# Patient Record
Sex: MALE | Race: WHITE | NOT HISPANIC OR LATINO | Employment: OTHER | URBAN - METROPOLITAN AREA
[De-identification: names, ages, dates, MRNs, and addresses within clinical notes are randomized per-mention and may not be internally consistent; named-entity substitution may affect disease eponyms.]

---

## 2023-03-23 LAB
ALANINE AMINOTRANSFERASE (SGPT) (U/L) IN SER/PLAS: 16 U/L (ref 10–52)
ALBUMIN (G/DL) IN SER/PLAS: 4.3 G/DL (ref 3.4–5)
ALKALINE PHOSPHATASE (U/L) IN SER/PLAS: 46 U/L (ref 33–136)
ANION GAP IN SER/PLAS: 9 MMOL/L (ref 10–20)
ASPARTATE AMINOTRANSFERASE (SGOT) (U/L) IN SER/PLAS: 19 U/L (ref 9–39)
BASOPHILS (10*3/UL) IN BLOOD BY AUTOMATED COUNT: 0.03 X10E9/L (ref 0–0.1)
BASOPHILS/100 LEUKOCYTES IN BLOOD BY AUTOMATED COUNT: 0.5 % (ref 0–2)
BILIRUBIN TOTAL (MG/DL) IN SER/PLAS: 0.7 MG/DL (ref 0–1.2)
CALCIUM (MG/DL) IN SER/PLAS: 9.3 MG/DL (ref 8.6–10.3)
CARBON DIOXIDE, TOTAL (MMOL/L) IN SER/PLAS: 32 MMOL/L (ref 21–32)
CHLORIDE (MMOL/L) IN SER/PLAS: 103 MMOL/L (ref 98–107)
CHOLESTEROL (MG/DL) IN SER/PLAS: 171 MG/DL (ref 0–199)
CHOLESTEROL IN HDL (MG/DL) IN SER/PLAS: 47.5 MG/DL
CHOLESTEROL/HDL RATIO: 3.6
CREATININE (MG/DL) IN SER/PLAS: 0.73 MG/DL (ref 0.5–1.3)
EOSINOPHILS (10*3/UL) IN BLOOD BY AUTOMATED COUNT: 0.35 X10E9/L (ref 0–0.7)
EOSINOPHILS/100 LEUKOCYTES IN BLOOD BY AUTOMATED COUNT: 5.5 % (ref 0–6)
ERYTHROCYTE DISTRIBUTION WIDTH (RATIO) BY AUTOMATED COUNT: 13.3 % (ref 11.5–14.5)
ERYTHROCYTE MEAN CORPUSCULAR HEMOGLOBIN CONCENTRATION (G/DL) BY AUTOMATED: 32.7 G/DL (ref 32–36)
ERYTHROCYTE MEAN CORPUSCULAR VOLUME (FL) BY AUTOMATED COUNT: 88 FL (ref 80–100)
ERYTHROCYTES (10*6/UL) IN BLOOD BY AUTOMATED COUNT: 5.33 X10E12/L (ref 4.5–5.9)
GFR MALE: >90 ML/MIN/1.73M2
GLUCOSE (MG/DL) IN SER/PLAS: 88 MG/DL (ref 74–99)
HEMATOCRIT (%) IN BLOOD BY AUTOMATED COUNT: 47.1 % (ref 41–52)
HEMOGLOBIN (G/DL) IN BLOOD: 15.4 G/DL (ref 13.5–17.5)
IMMATURE GRANULOCYTES/100 LEUKOCYTES IN BLOOD BY AUTOMATED COUNT: 0.5 % (ref 0–0.9)
LDL: 96 MG/DL (ref 0–99)
LEUKOCYTES (10*3/UL) IN BLOOD BY AUTOMATED COUNT: 6.4 X10E9/L (ref 4.4–11.3)
LYMPHOCYTES (10*3/UL) IN BLOOD BY AUTOMATED COUNT: 1.91 X10E9/L (ref 1.2–4.8)
LYMPHOCYTES/100 LEUKOCYTES IN BLOOD BY AUTOMATED COUNT: 29.9 % (ref 13–44)
MAGNESIUM (MG/DL) IN SER/PLAS: 1.86 MG/DL (ref 1.6–2.4)
MONOCYTES (10*3/UL) IN BLOOD BY AUTOMATED COUNT: 0.55 X10E9/L (ref 0.1–1)
MONOCYTES/100 LEUKOCYTES IN BLOOD BY AUTOMATED COUNT: 8.6 % (ref 2–10)
NEUTROPHILS (10*3/UL) IN BLOOD BY AUTOMATED COUNT: 3.51 X10E9/L (ref 1.2–7.7)
NEUTROPHILS/100 LEUKOCYTES IN BLOOD BY AUTOMATED COUNT: 55 % (ref 40–80)
PLATELETS (10*3/UL) IN BLOOD AUTOMATED COUNT: 242 X10E9/L (ref 150–450)
POTASSIUM (MMOL/L) IN SER/PLAS: 4.5 MMOL/L (ref 3.5–5.3)
PROSTATE SPECIFIC AG (NG/ML) IN SER/PLAS: 7.66 NG/ML (ref 0–4)
PROTEIN TOTAL: 6.7 G/DL (ref 6.4–8.2)
SODIUM (MMOL/L) IN SER/PLAS: 139 MMOL/L (ref 136–145)
THYROTROPIN (MIU/L) IN SER/PLAS BY DETECTION LIMIT <= 0.05 MIU/L: 1.83 MIU/L (ref 0.44–3.98)
TRIGLYCERIDE (MG/DL) IN SER/PLAS: 140 MG/DL (ref 0–149)
UREA NITROGEN (MG/DL) IN SER/PLAS: 12 MG/DL (ref 6–23)
VLDL: 28 MG/DL (ref 0–40)

## 2023-05-01 DIAGNOSIS — I10 PRIMARY HYPERTENSION: Primary | ICD-10-CM

## 2023-05-01 PROBLEM — G47.30 SLEEP APNEA: Status: ACTIVE | Noted: 2023-05-01

## 2023-05-01 PROBLEM — R47.01 EXPRESSIVE APHASIA: Status: ACTIVE | Noted: 2023-05-01

## 2023-05-01 PROBLEM — F17.200 TOBACCO USE DISORDER: Status: ACTIVE | Noted: 2023-05-01

## 2023-05-01 PROBLEM — N40.1 BENIGN PROSTATIC HYPERPLASIA WITH URINARY FREQUENCY: Status: ACTIVE | Noted: 2023-05-01

## 2023-05-01 PROBLEM — C61 PROSTATE CANCER (MULTI): Status: ACTIVE | Noted: 2023-05-01

## 2023-05-01 PROBLEM — R26.81 UNSTEADY GAIT: Status: ACTIVE | Noted: 2023-05-01

## 2023-05-01 PROBLEM — R35.0 BENIGN PROSTATIC HYPERPLASIA WITH URINARY FREQUENCY: Status: ACTIVE | Noted: 2023-05-01

## 2023-05-01 PROBLEM — J44.9 CHRONIC OBSTRUCTIVE PULMONARY DISEASE (MULTI): Status: ACTIVE | Noted: 2023-05-01

## 2023-05-01 PROBLEM — G31.2 ALCOHOLIC ENCEPHALOPATHY (CMS-HCC): Status: ACTIVE | Noted: 2023-05-01

## 2023-05-01 RX ORDER — TAMSULOSIN HYDROCHLORIDE 0.4 MG/1
1 CAPSULE ORAL
COMMUNITY
Start: 2022-06-07 | End: 2023-10-23 | Stop reason: SDUPTHER

## 2023-05-01 RX ORDER — FLUTICASONE FUROATE AND VILANTEROL 200; 25 UG/1; UG/1
1 POWDER RESPIRATORY (INHALATION) DAILY
COMMUNITY
Start: 2022-01-10 | End: 2024-04-03 | Stop reason: ALTCHOICE

## 2023-05-01 RX ORDER — LISINOPRIL 2.5 MG/1
1 TABLET ORAL DAILY
COMMUNITY
Start: 2019-09-05

## 2023-05-01 RX ORDER — VARENICLINE TARTRATE 1 MG/1
1 TABLET, FILM COATED ORAL 2 TIMES DAILY
COMMUNITY
Start: 2022-05-17 | End: 2023-08-16

## 2023-05-01 RX ORDER — CARVEDILOL 3.12 MG/1
1 TABLET ORAL
COMMUNITY
Start: 2019-09-05 | End: 2023-05-01 | Stop reason: SDUPTHER

## 2023-05-01 RX ORDER — TIZANIDINE 4 MG/1
1 TABLET ORAL 3 TIMES DAILY PRN
COMMUNITY
Start: 2020-08-07 | End: 2024-04-03 | Stop reason: SDUPTHER

## 2023-05-01 RX ORDER — ASPIRIN 81 MG/1
1 TABLET ORAL DAILY
COMMUNITY

## 2023-05-01 RX ORDER — CARVEDILOL 3.12 MG/1
3.12 TABLET ORAL
Qty: 60 TABLET | Refills: 5 | Status: SHIPPED | OUTPATIENT
Start: 2023-05-01 | End: 2023-10-23

## 2023-05-01 RX ORDER — PANTOPRAZOLE SODIUM 20 MG/1
1 TABLET, DELAYED RELEASE ORAL DAILY
COMMUNITY
Start: 2020-02-25 | End: 2023-08-16

## 2023-08-16 ENCOUNTER — OFFICE VISIT (OUTPATIENT)
Dept: PRIMARY CARE | Facility: CLINIC | Age: 63
End: 2023-08-16
Payer: COMMERCIAL

## 2023-08-16 VITALS
DIASTOLIC BLOOD PRESSURE: 84 MMHG | HEIGHT: 71 IN | WEIGHT: 216.2 LBS | HEART RATE: 66 BPM | TEMPERATURE: 97.6 F | BODY MASS INDEX: 30.27 KG/M2 | OXYGEN SATURATION: 96 % | RESPIRATION RATE: 16 BRPM | SYSTOLIC BLOOD PRESSURE: 120 MMHG

## 2023-08-16 DIAGNOSIS — R42 VERTIGO: ICD-10-CM

## 2023-08-16 DIAGNOSIS — L84 CALLUS OF FOOT: ICD-10-CM

## 2023-08-16 DIAGNOSIS — C61 PROSTATE CANCER (MULTI): ICD-10-CM

## 2023-08-16 DIAGNOSIS — J44.9 CHRONIC OBSTRUCTIVE PULMONARY DISEASE, UNSPECIFIED COPD TYPE (MULTI): ICD-10-CM

## 2023-08-16 DIAGNOSIS — I10 PRIMARY HYPERTENSION: ICD-10-CM

## 2023-08-16 DIAGNOSIS — Z00.00 ROUTINE GENERAL MEDICAL EXAMINATION AT A HEALTH CARE FACILITY: Primary | ICD-10-CM

## 2023-08-16 PROBLEM — G31.2 ALCOHOLIC ENCEPHALOPATHY (CMS-HCC): Status: RESOLVED | Noted: 2023-05-01 | Resolved: 2023-08-16

## 2023-08-16 PROCEDURE — 99396 PREV VISIT EST AGE 40-64: CPT | Performed by: FAMILY MEDICINE

## 2023-08-16 PROCEDURE — 3079F DIAST BP 80-89 MM HG: CPT | Performed by: FAMILY MEDICINE

## 2023-08-16 PROCEDURE — 11055 PARING/CUTG B9 HYPRKER LES 1: CPT | Performed by: FAMILY MEDICINE

## 2023-08-16 PROCEDURE — 3074F SYST BP LT 130 MM HG: CPT | Performed by: FAMILY MEDICINE

## 2023-08-16 PROCEDURE — 4004F PT TOBACCO SCREEN RCVD TLK: CPT | Performed by: FAMILY MEDICINE

## 2023-08-16 RX ORDER — MECLIZINE HYDROCHLORIDE 25 MG/1
25 TABLET ORAL 3 TIMES DAILY PRN
Qty: 90 TABLET | Refills: 11 | Status: SHIPPED | OUTPATIENT
Start: 2023-08-16 | End: 2024-04-03 | Stop reason: ALTCHOICE

## 2023-08-16 ASSESSMENT — ENCOUNTER SYMPTOMS
SORE THROAT: 0
NUMBNESS: 0
ADENOPATHY: 0
ARTHRALGIAS: 0
SEIZURES: 0
FATIGUE: 0
NERVOUS/ANXIOUS: 0
CONSTIPATION: 0
FACIAL ASYMMETRY: 0
SHORTNESS OF BREATH: 0
HALLUCINATIONS: 0
ABDOMINAL DISTENTION: 0
EYE ITCHING: 0
FREQUENCY: 0
WOUND: 0
CHEST TIGHTNESS: 0
BRUISES/BLEEDS EASILY: 0
PHOTOPHOBIA: 0
HEMATURIA: 0
RHINORRHEA: 0
CHOKING: 0
DIARRHEA: 0
ABDOMINAL PAIN: 0
EYE PAIN: 0
JOINT SWELLING: 0
WHEEZING: 0
BACK PAIN: 0
VOMITING: 0
POLYDIPSIA: 0
EYE REDNESS: 0
BLOOD IN STOOL: 0
MYALGIAS: 0
NECK PAIN: 0
DYSURIA: 0
SINUS PRESSURE: 0
NECK STIFFNESS: 0
VOICE CHANGE: 0
CONFUSION: 0
ACTIVITY CHANGE: 0
AGITATION: 0
APPETITE CHANGE: 0
TROUBLE SWALLOWING: 0
EYE DISCHARGE: 0
NAUSEA: 0
WEAKNESS: 0
TREMORS: 0
SLEEP DISTURBANCE: 0
PALPITATIONS: 0
CHILLS: 0
DYSPHORIC MOOD: 0
FEVER: 0
LIGHT-HEADEDNESS: 0
DIAPHORESIS: 0
COUGH: 0
HEADACHES: 0
UNEXPECTED WEIGHT CHANGE: 0
FLANK PAIN: 0

## 2023-08-16 NOTE — PROGRESS NOTES
Subjective   Patient ID: Home Salas is a 63 y.o. male who presents for Annual Exam (Labs done in March. /Patient would like to discuss left foot pain x few months. Patient states there is a growth there, maybe a wart, would like it checked out. ) and Dizziness (Patient c.o still getting dizzy when laying down and looking up. ).    Well Adult Physical   Patient here for a comprehensive physical exam.The patient reports overall doing well but has chronic issues with vertigo when he looks up or when he is lying flat and reading in bed.  Also has a callus on the bottom of his left foot that is painful.  He has numerous warts that he would like treated.  Has chronic expressive aphasia communicates through his sister and also has an electronic pad that he utilizes to write notes.    Do you take any herbs or supplements that were not prescribed by a doctor? no   Are you taking calcium supplements? no   Are you taking aspirin daily? yes     History:  Date last PSA: 3/23/2023        Hypertension  This is a chronic problem. The current episode started more than 1 year ago. The problem is unchanged. The problem is controlled. Pertinent negatives include no chest pain, headaches, neck pain, palpitations or shortness of breath. There are no associated agents to hypertension. Past treatments include ACE inhibitors and beta blockers. The current treatment provides significant improvement. There are no compliance problems.  Hypertensive end-organ damage includes CVA. There is no history of a hypertension causing med or a thyroid problem.   Dizziness  This is a chronic problem. The current episode started more than 1 year ago. The problem occurs daily. The problem has been unchanged. Associated symptoms include vertigo. Pertinent negatives include no abdominal pain, arthralgias, chest pain, chills, congestion, coughing, diaphoresis, fatigue, fever, headaches, joint swelling, myalgias, nausea, neck pain, numbness, rash, sore  throat, vomiting or weakness. Exacerbated by: Lying down or looking up.  Anytime his head is parallel to the ground. He has tried position changes (Change in position has given significant improvement as long as he maintains different position.  Has done vestibular rehab in the past with no improvement) for the symptoms. The treatment provided significant relief.        Review of Systems   Constitutional:  Negative for activity change, appetite change, chills, diaphoresis, fatigue, fever and unexpected weight change.   HENT:  Negative for congestion, ear pain, hearing loss, nosebleeds, postnasal drip, rhinorrhea, sinus pressure, sneezing, sore throat, tinnitus, trouble swallowing and voice change.    Eyes:  Negative for photophobia, pain, discharge, redness, itching and visual disturbance.   Respiratory:  Negative for cough, choking, chest tightness, shortness of breath and wheezing.    Cardiovascular:  Negative for chest pain, palpitations and leg swelling.   Gastrointestinal:  Negative for abdominal distention, abdominal pain, blood in stool, constipation, diarrhea, nausea and vomiting.   Endocrine: Negative for cold intolerance, heat intolerance, polydipsia and polyuria.   Genitourinary:  Negative for dysuria, flank pain, frequency, hematuria and urgency.   Musculoskeletal:  Negative for arthralgias, back pain, joint swelling, myalgias, neck pain and neck stiffness.   Skin:  Negative for rash and wound.        Warts bilateral hands  Callus left foot  Numerous actinic keratoses bilateral forearms   Allergic/Immunologic: Negative for immunocompromised state.   Neurological:  Positive for dizziness, vertigo and speech difficulty. Negative for tremors, seizures, syncope, facial asymmetry, weakness, light-headedness, numbness and headaches.        Chronic expressive aphasia as chronic sequelae of prior CVA   Hematological:  Negative for adenopathy. Does not bruise/bleed easily.   Psychiatric/Behavioral:  Negative for  "agitation, behavioral problems, confusion, dysphoric mood, hallucinations, self-injury, sleep disturbance and suicidal ideas. The patient is not nervous/anxious.        Objective   /84 (BP Location: Right arm, Patient Position: Sitting)   Pulse 66   Temp 36.4 °C (97.6 °F)   Resp 16   Ht 1.803 m (5' 11\")   Wt 98.1 kg (216 lb 3.2 oz)   SpO2 96%   BMI 30.15 kg/m²     Physical Exam  Constitutional:       General: He is not in acute distress.     Appearance: He is not ill-appearing or diaphoretic.   HENT:      Head: Normocephalic and atraumatic.      Right Ear: External ear normal.      Left Ear: External ear normal.      Nose: Nose normal. No rhinorrhea.   Eyes:      General: Lids are normal. No scleral icterus.        Right eye: No discharge.         Left eye: No discharge.      Conjunctiva/sclera: Conjunctivae normal.   Cardiovascular:      Rate and Rhythm: Normal rate and regular rhythm.      Pulses: Normal pulses.      Heart sounds: No murmur heard.  Pulmonary:      Effort: Pulmonary effort is normal. No respiratory distress.      Breath sounds: No decreased breath sounds, wheezing, rhonchi or rales.   Abdominal:      General: Bowel sounds are normal. There is no distension.      Palpations: Abdomen is soft. There is no mass.      Tenderness: There is no abdominal tenderness. There is no guarding or rebound.   Musculoskeletal:         General: No swelling, tenderness or deformity.      Cervical back: No rigidity or tenderness.      Right lower leg: No edema.      Left lower leg: No edema.        Feet:    Feet:      Comments: Callus as noted above  Lymphadenopathy:      Cervical: No cervical adenopathy.      Upper Body:      Right upper body: No supraclavicular adenopathy.      Left upper body: No supraclavicular adenopathy.   Skin:     General: Skin is warm and dry.      Coloration: Skin is not jaundiced or pale.      Findings: No erythema, lesion or rash.             Comments: Warts bilateral " hands  Actinic keratoses bilateral forearms   Neurological:      General: No focal deficit present.      Mental Status: He is alert and oriented to person, place, and time.      Cranial Nerves: Dysarthria present.      Sensory: No sensory deficit.      Motor: No weakness or tremor.      Coordination: Romberg sign negative. Coordination normal.      Gait: Gait normal.      Comments: Expressive aphasia   Psychiatric:         Mood and Affect: Mood normal. Affect is not inappropriate.         Behavior: Behavior normal.         Assessment/Plan   Diagnoses and all orders for this visit:  Routine general medical examination at a health care facility  Primary hypertension  -     CBC and Auto Differential; Future  -     Comprehensive Metabolic Panel; Future  -     Lipid Panel; Future  -     Magnesium; Future  -     TSH with reflex to Free T4 if abnormal; Future  -     Follow Up In [facility] - PCP - Established; Future  Chronic obstructive pulmonary disease, unspecified COPD type (CMS/HCC)  -     CBC and Auto Differential; Future  -     Magnesium; Future  -     Follow Up In [facility] - PCP - Established; Future  Prostate cancer (CMS/HCC)  Vertigo  -     meclizine (Antivert) 25 mg tablet; Take 1 tablet (25 mg) by mouth 3 times a day as needed for dizziness.  Callus of foot  -     Trim Hyperkeratonic Skin Lesion; Future    Risks of callus trimming were discussed.  Patient stated understanding acceptance of risks.  Consent for trimming was obtained.  Callus was cleansed with alcohol and trimmed utilizing a biopsy punch.  Patient tolerated well and reported immediate relief of foot pain.  Aftercare instructions were given.  He is to soak feet daily for 30 minutes and then use pumice stone to remove any excess skin in the area of the callus.  Patient stated understanding agreement with that plan.  As far as the warts they were each treated with a single freeze/thaw cycle of liquid nitrogen.  And 5 actinic  keratosis left forearm and 4 actinic keratoses right forearm were also treated with a single freeze/thaw cycle.  Aftercare instructions were discussed patient tolerated procedure well.  If he does not get significant improvement we will arrange for dermatologic evaluation.  Patient stated agreement with that plan.

## 2023-08-17 ASSESSMENT — ENCOUNTER SYMPTOMS
SPEECH DIFFICULTY: 1
VERTIGO: 1
DIZZINESS: 1
HYPERTENSION: 1

## 2023-09-20 ENCOUNTER — TELEPHONE (OUTPATIENT)
Dept: PRIMARY CARE | Facility: CLINIC | Age: 63
End: 2023-09-20
Payer: COMMERCIAL

## 2023-09-20 DIAGNOSIS — R07.81 RIB PAIN ON LEFT SIDE: ICD-10-CM

## 2023-09-20 DIAGNOSIS — Z91.81 STATUS POST FALL: ICD-10-CM

## 2023-09-20 NOTE — TELEPHONE ENCOUNTER
Dr Marie pt calling with concerns, pt seen on 8/16 and mentioned he had a recent fall and hurt his Lt Side - rib area. Pt states hasn't gotten any better and asking if Dr would order an xray for him or his recommendations?? Pt can be reached at [phone]

## 2023-10-22 DIAGNOSIS — I10 PRIMARY HYPERTENSION: ICD-10-CM

## 2023-10-23 DIAGNOSIS — N40.1 BENIGN PROSTATIC HYPERPLASIA WITH URINARY FREQUENCY: Primary | ICD-10-CM

## 2023-10-23 DIAGNOSIS — R35.0 BENIGN PROSTATIC HYPERPLASIA WITH URINARY FREQUENCY: Primary | ICD-10-CM

## 2023-10-23 RX ORDER — CARVEDILOL 3.12 MG/1
3.12 TABLET ORAL
Qty: 60 TABLET | Refills: 5 | Status: SHIPPED | OUTPATIENT
Start: 2023-10-23 | End: 2024-04-03 | Stop reason: SDUPTHER

## 2023-10-23 RX ORDER — TAMSULOSIN HYDROCHLORIDE 0.4 MG/1
0.4 CAPSULE ORAL DAILY
Qty: 90 CAPSULE | Refills: 1 | Status: SHIPPED | OUTPATIENT
Start: 2023-10-23 | End: 2024-03-27 | Stop reason: SDUPTHER

## 2023-10-31 ENCOUNTER — CLINICAL SUPPORT (OUTPATIENT)
Dept: PRIMARY CARE | Facility: CLINIC | Age: 63
End: 2023-10-31
Payer: COMMERCIAL

## 2023-10-31 DIAGNOSIS — Z23 NEED FOR VACCINATION: ICD-10-CM

## 2023-10-31 PROCEDURE — 91320 SARSCV2 VAC 30MCG TRS-SUC IM: CPT | Performed by: FAMILY MEDICINE

## 2023-10-31 PROCEDURE — 90480 ADMN SARSCOV2 VAC 1/ONLY CMP: CPT | Performed by: FAMILY MEDICINE

## 2023-10-31 NOTE — PROGRESS NOTES
Home Salas here today for 8014-1463 dose of CoV-19 vaccine, Pfizer BiValent. Questionnaire completed. CoV Screening questions answered. Administered in right Deltoid. Patient tolerated well.

## 2024-03-27 DIAGNOSIS — R35.0 BENIGN PROSTATIC HYPERPLASIA WITH URINARY FREQUENCY: ICD-10-CM

## 2024-03-27 DIAGNOSIS — N40.1 BENIGN PROSTATIC HYPERPLASIA WITH URINARY FREQUENCY: ICD-10-CM

## 2024-03-27 RX ORDER — TAMSULOSIN HYDROCHLORIDE 0.4 MG/1
0.4 CAPSULE ORAL DAILY
Qty: 90 CAPSULE | Refills: 1 | Status: SHIPPED | OUTPATIENT
Start: 2024-03-27 | End: 2024-09-23

## 2024-04-03 ENCOUNTER — OFFICE VISIT (OUTPATIENT)
Dept: PRIMARY CARE | Facility: CLINIC | Age: 64
End: 2024-04-03
Payer: COMMERCIAL

## 2024-04-03 VITALS
TEMPERATURE: 97 F | RESPIRATION RATE: 18 BRPM | OXYGEN SATURATION: 95 % | HEIGHT: 71 IN | BODY MASS INDEX: 31.5 KG/M2 | HEART RATE: 68 BPM | WEIGHT: 225 LBS | DIASTOLIC BLOOD PRESSURE: 78 MMHG | SYSTOLIC BLOOD PRESSURE: 128 MMHG

## 2024-04-03 DIAGNOSIS — Z12.5 SPECIAL SCREENING EXAMINATION FOR NEOPLASM OF PROSTATE: ICD-10-CM

## 2024-04-03 DIAGNOSIS — M54.50 CHRONIC LEFT-SIDED LOW BACK PAIN WITHOUT SCIATICA: ICD-10-CM

## 2024-04-03 DIAGNOSIS — I10 PRIMARY HYPERTENSION: ICD-10-CM

## 2024-04-03 DIAGNOSIS — H92.02 EAR PAIN, LEFT: Primary | ICD-10-CM

## 2024-04-03 DIAGNOSIS — G89.29 CHRONIC LEFT-SIDED LOW BACK PAIN WITHOUT SCIATICA: ICD-10-CM

## 2024-04-03 PROCEDURE — 99213 OFFICE O/P EST LOW 20 MIN: CPT | Performed by: FAMILY MEDICINE

## 2024-04-03 PROCEDURE — 3074F SYST BP LT 130 MM HG: CPT | Performed by: FAMILY MEDICINE

## 2024-04-03 PROCEDURE — 3078F DIAST BP <80 MM HG: CPT | Performed by: FAMILY MEDICINE

## 2024-04-03 RX ORDER — AMOXICILLIN 875 MG/1
875 TABLET, FILM COATED ORAL 2 TIMES DAILY
Qty: 28 TABLET | Refills: 0 | Status: SHIPPED | OUTPATIENT
Start: 2024-04-03 | End: 2024-04-17

## 2024-04-03 RX ORDER — CARVEDILOL 3.12 MG/1
3.12 TABLET ORAL
Qty: 60 TABLET | Refills: 5 | Status: SHIPPED | OUTPATIENT
Start: 2024-04-03

## 2024-04-03 RX ORDER — TIZANIDINE 4 MG/1
4 TABLET ORAL EVERY 8 HOURS PRN
Qty: 30 TABLET | Refills: 1 | Status: SHIPPED | OUTPATIENT
Start: 2024-04-03

## 2024-04-03 ASSESSMENT — ENCOUNTER SYMPTOMS
SEIZURES: 0
APPETITE CHANGE: 0
VOICE CHANGE: 0
ACTIVITY CHANGE: 0
SLEEP DISTURBANCE: 0
TREMORS: 0
PHOTOPHOBIA: 0
ARTHRALGIAS: 0
BACK PAIN: 0
PALPITATIONS: 0
CHEST TIGHTNESS: 0
DIZZINESS: 1
VERTIGO: 1
JOINT SWELLING: 0
CHOKING: 0
NECK PAIN: 0
EYE ITCHING: 0
DIAPHORESIS: 0
BRUISES/BLEEDS EASILY: 0
WHEEZING: 0
CONSTIPATION: 0
DYSURIA: 0
EYE DISCHARGE: 0
EYE REDNESS: 0
SINUS PRESSURE: 0
FEVER: 0
TROUBLE SWALLOWING: 0
FLANK PAIN: 0
EYE PAIN: 0
CONFUSION: 0
ABDOMINAL DISTENTION: 0
VOMITING: 0
FREQUENCY: 0
SINUS PAIN: 0
DYSPHORIC MOOD: 0
FATIGUE: 0
CHILLS: 0
FACIAL ASYMMETRY: 0
SPEECH DIFFICULTY: 1
AGITATION: 0
LIGHT-HEADEDNESS: 0
ABDOMINAL PAIN: 0
WOUND: 0
NECK STIFFNESS: 0
NUMBNESS: 0
WEAKNESS: 0
HYPERTENSION: 1
NERVOUS/ANXIOUS: 0
POLYDIPSIA: 0
MYALGIAS: 0
RHINORRHEA: 0
NAUSEA: 0
HEADACHES: 0
SORE THROAT: 0
HALLUCINATIONS: 0
ADENOPATHY: 0
HEMATURIA: 0
COUGH: 0
SHORTNESS OF BREATH: 0
UNEXPECTED WEIGHT CHANGE: 0
BLOOD IN STOOL: 0
DIARRHEA: 0

## 2024-04-03 NOTE — PROGRESS NOTES
Subjective   Patient ID: Home Salas is a 63 y.o. male who presents for Earache (Left ).    Patient is here regarding left earache, had some discharge this morning, previously had wax removed 2 months ago. Patient additionally reports meclizine has not helped dizziness, needs refill of carvedilol, tizanidine, and order for updated blood work. Has chronic expressive aphasia communicates through his sister (not present at this appointment) and also has an electronic pad that he utilizes to write notes.      Hypertension  This is a chronic problem. The current episode started more than 1 year ago. The problem is unchanged. The problem is controlled. Pertinent negatives include no chest pain, headaches, neck pain, palpitations or shortness of breath. There are no associated agents to hypertension. Past treatments include ACE inhibitors and beta blockers. The current treatment provides significant improvement. There are no compliance problems.  Hypertensive end-organ damage includes CVA. There is no history of a hypertension causing med or a thyroid problem.   Dizziness  This is a chronic problem. The current episode started more than 1 year ago. The problem occurs daily. The problem has been unchanged. Associated symptoms include vertigo. Pertinent negatives include no abdominal pain, arthralgias, chest pain, chills, congestion, coughing, diaphoresis, fatigue, fever, headaches, joint swelling, myalgias, nausea, neck pain, numbness, rash, sore throat, vomiting or weakness. Exacerbated by: Lying down or looking up.  Anytime his head is parallel to the ground. He has tried position changes (Change in position has given significant improvement as long as he maintains different position.  Has done vestibular rehab in the past with no improvement) for the symptoms. The treatment provided no relief (Meclizine has not helped dizziness, pt wants to discontinue treatment by medication).   Earache   There is pain in the left ear.  This is a new problem. Episode onset: two days ago. Episode frequency: morning and night. The problem has been unchanged. There has been no fever. The pain is at a severity of 4/10. Associated symptoms include ear discharge. Pertinent negatives include no abdominal pain, coughing, diarrhea, headaches, hearing loss, neck pain, rash, rhinorrhea, sore throat or vomiting. He has tried nothing for the symptoms.        Review of Systems   Constitutional:  Negative for activity change, appetite change, chills, diaphoresis, fatigue, fever and unexpected weight change.   HENT:  Positive for ear discharge and ear pain. Negative for congestion, hearing loss, nosebleeds, postnasal drip, rhinorrhea, sinus pressure, sinus pain, sneezing, sore throat, tinnitus, trouble swallowing and voice change.         Left ear discharge occasionally, most recent episode this morning   Eyes:  Negative for photophobia, pain, discharge, redness, itching and visual disturbance.   Respiratory:  Negative for cough, choking, chest tightness, shortness of breath and wheezing.    Cardiovascular:  Negative for chest pain, palpitations and leg swelling.   Gastrointestinal:  Negative for abdominal distention, abdominal pain, blood in stool, constipation, diarrhea, nausea and vomiting.   Endocrine: Negative for cold intolerance, heat intolerance, polydipsia and polyuria.   Genitourinary:  Negative for dysuria, flank pain, frequency, hematuria and urgency.   Musculoskeletal:  Negative for arthralgias, back pain, joint swelling, myalgias, neck pain and neck stiffness.   Skin:  Negative for rash and wound.        Warts bilateral hands  Callus left foot  Numerous actinic keratoses bilateral forearms   Allergic/Immunologic: Negative for immunocompromised state.   Neurological:  Positive for dizziness, vertigo and speech difficulty. Negative for tremors, seizures, syncope, facial asymmetry, weakness, light-headedness, numbness and headaches.        Chronic  "expressive aphasia as chronic sequelae of prior CVA   Hematological:  Negative for adenopathy. Does not bruise/bleed easily.   Psychiatric/Behavioral:  Negative for agitation, behavioral problems, confusion, dysphoric mood, hallucinations, self-injury, sleep disturbance and suicidal ideas. The patient is not nervous/anxious.      Objective   /78 (BP Location: Left arm, Patient Position: Sitting, BP Cuff Size: Large adult)   Pulse 68   Temp 36.1 °C (97 °F) (Temporal)   Resp 18   Ht 1.803 m (5' 11\")   Wt 102 kg (225 lb)   SpO2 95%   BMI 31.38 kg/m²     Physical Exam  Constitutional:       General: He is not in acute distress.     Appearance: He is not ill-appearing or diaphoretic.   HENT:      Head: Normocephalic and atraumatic.      Right Ear: External ear normal. There is impacted cerumen.      Left Ear: External ear normal. A middle ear effusion is present. There is no impacted cerumen.      Nose: Nose normal. No rhinorrhea.   Eyes:      General: Lids are normal. No scleral icterus.        Right eye: No discharge.         Left eye: No discharge.      Conjunctiva/sclera: Conjunctivae normal.   Cardiovascular:      Rate and Rhythm: Normal rate and regular rhythm.      Pulses: Normal pulses.      Heart sounds: No murmur heard.  Pulmonary:      Effort: Pulmonary effort is normal. No respiratory distress.      Breath sounds: No decreased breath sounds, wheezing, rhonchi or rales.   Abdominal:      General: Bowel sounds are normal. There is no distension.      Palpations: Abdomen is soft. There is no mass.      Tenderness: There is no abdominal tenderness. There is no guarding or rebound.   Musculoskeletal:         General: No swelling, tenderness or deformity.      Cervical back: No rigidity or tenderness.      Right lower leg: No edema.      Left lower leg: No edema.   Lymphadenopathy:      Cervical: No cervical adenopathy.      Upper Body:      Right upper body: No supraclavicular adenopathy.      Left " upper body: No supraclavicular adenopathy.   Skin:     General: Skin is warm and dry.      Coloration: Skin is not jaundiced or pale.      Findings: No erythema, lesion or rash.   Neurological:      General: No focal deficit present.      Mental Status: He is alert and oriented to person, place, and time.      Cranial Nerves: Dysarthria present.      Sensory: No sensory deficit.      Motor: No weakness or tremor.      Coordination: Romberg sign negative. Coordination normal.      Gait: Gait normal.      Comments: Expressive aphasia   Psychiatric:         Mood and Affect: Mood normal. Affect is not inappropriate.         Behavior: Behavior normal.       Assessment/Plan   Diagnoses and all orders for this visit:  Ear pain, left  -     Follow Up In [facility] - PCP - Medicare Annual; Future  -     amoxicillin (Amoxil) 875 mg tablet; Take 1 tablet (875 mg) by mouth 2 times a day for 14 days.  Primary hypertension  -     Follow Up In [facility]  -     carvedilol (Coreg) 3.125 mg tablet; Take 1 tablet (3.125 mg) by mouth 2 times a day with meals.  -     Follow Up In [facility] - PCP - Medicare Annual; Future  Chronic left-sided low back pain without sciatica  -     tiZANidine (Zanaflex) 4 mg tablet; Take 1 tablet (4 mg) by mouth every 8 hours if needed for muscle spasms.  Special screening examination for neoplasm of prostate  -     Prostate Specific Antigen, Screen; Future    Patient has left middle ear pain with likely etiology of middle ear effusion; trauma to TM unlikely cause of pain given nonerythematous, atraumatic TM with fluid level present. Plan to treat with 14 day course of amoxicillin and nasal irrigation. Patient stated agreement with that plan.    Patient's chronic primary hypertension has been responsive to current dose of carvedilol. Plan to refill medication at current dose. Patient stated agreement with that plan.    Patient reports back pain has been  responsive to current dose of tizanidine. Plan to refill medication at current dose. Patient stated agreement with that plan.    Patient will follow up in 6 months with routine blood work and PSA.    Patient was seen and evaluated with student. I was present for critical portion of history and physical exam. I reviewed note with student and agree with findings as written  SIGIFREDO DO

## 2024-04-03 NOTE — PROGRESS NOTES
"Subjective   Patient ID: Home Salas is a 63 y.o. male who presents for Earache (Left ).    HPI     Review of Systems    Objective   /78 (BP Location: Left arm, Patient Position: Sitting, BP Cuff Size: Large adult)   Pulse 68   Temp 36.1 °C (97 °F) (Temporal)   Resp 18   Ht 1.803 m (5' 11\")   Wt 102 kg (225 lb)   SpO2 95%   BMI 31.38 kg/m²     Physical Exam    Assessment/Plan   {Assess/PlanSmartLinks:28989}       "

## 2024-04-19 ENCOUNTER — TELEPHONE (OUTPATIENT)
Dept: PRIMARY CARE | Facility: CLINIC | Age: 64
End: 2024-04-19
Payer: COMMERCIAL

## 2024-04-19 DIAGNOSIS — M79.673 PAIN OF FOOT, UNSPECIFIED LATERALITY: ICD-10-CM

## 2024-04-19 NOTE — TELEPHONE ENCOUNTER
Patient's wife called states patient needs referral to podiatrist because his feet have been hurting him and sometimes hard to move his toes.     Patient or wife can be reached at [phone]

## 2024-10-07 DIAGNOSIS — I10 PRIMARY HYPERTENSION: ICD-10-CM

## 2024-10-08 RX ORDER — CARVEDILOL 3.12 MG/1
3.12 TABLET ORAL
Qty: 60 TABLET | Refills: 5 | Status: SHIPPED | OUTPATIENT
Start: 2024-10-08

## 2024-11-05 ENCOUNTER — LAB (OUTPATIENT)
Dept: LAB | Facility: LAB | Age: 64
End: 2024-11-05
Payer: COMMERCIAL

## 2024-11-05 DIAGNOSIS — Z12.5 SPECIAL SCREENING EXAMINATION FOR NEOPLASM OF PROSTATE: ICD-10-CM

## 2024-11-05 LAB — PSA SERPL-MCNC: 10.16 NG/ML

## 2024-11-05 PROCEDURE — G0103 PSA SCREENING: HCPCS

## 2024-11-08 DIAGNOSIS — R35.0 BENIGN PROSTATIC HYPERPLASIA WITH URINARY FREQUENCY: ICD-10-CM

## 2024-11-08 DIAGNOSIS — N40.1 BENIGN PROSTATIC HYPERPLASIA WITH URINARY FREQUENCY: ICD-10-CM

## 2024-11-08 DIAGNOSIS — I10 HYPERTENSION, UNSPECIFIED TYPE: ICD-10-CM

## 2024-11-08 DIAGNOSIS — Z00.00 ENCOUNTER FOR ANNUAL PHYSICAL EXAM: ICD-10-CM

## 2024-11-12 ENCOUNTER — APPOINTMENT (OUTPATIENT)
Dept: PRIMARY CARE | Facility: CLINIC | Age: 64
End: 2024-11-12
Payer: COMMERCIAL

## 2024-11-12 VITALS
TEMPERATURE: 96.2 F | DIASTOLIC BLOOD PRESSURE: 79 MMHG | HEART RATE: 70 BPM | BODY MASS INDEX: 31.57 KG/M2 | SYSTOLIC BLOOD PRESSURE: 110 MMHG | RESPIRATION RATE: 16 BRPM | OXYGEN SATURATION: 95 % | HEIGHT: 71 IN | WEIGHT: 225.5 LBS

## 2024-11-12 DIAGNOSIS — Z23 NEED FOR VACCINATION: ICD-10-CM

## 2024-11-12 DIAGNOSIS — C61 PROSTATE CANCER (MULTI): ICD-10-CM

## 2024-11-12 DIAGNOSIS — M54.50 CHRONIC LEFT-SIDED LOW BACK PAIN WITHOUT SCIATICA: ICD-10-CM

## 2024-11-12 DIAGNOSIS — J44.9 CHRONIC OBSTRUCTIVE PULMONARY DISEASE, UNSPECIFIED COPD TYPE (MULTI): ICD-10-CM

## 2024-11-12 DIAGNOSIS — F17.200 TOBACCO USE DISORDER: ICD-10-CM

## 2024-11-12 DIAGNOSIS — Z00.00 ROUTINE GENERAL MEDICAL EXAMINATION AT HEALTH CARE FACILITY: Primary | ICD-10-CM

## 2024-11-12 DIAGNOSIS — I10 PRIMARY HYPERTENSION: ICD-10-CM

## 2024-11-12 DIAGNOSIS — Z71.89 ADVANCED DIRECTIVES, COUNSELING/DISCUSSION: ICD-10-CM

## 2024-11-12 DIAGNOSIS — G89.29 CHRONIC LEFT-SIDED LOW BACK PAIN WITHOUT SCIATICA: ICD-10-CM

## 2024-11-12 PROCEDURE — G0439 PPPS, SUBSEQ VISIT: HCPCS | Performed by: FAMILY MEDICINE

## 2024-11-12 PROCEDURE — 99214 OFFICE O/P EST MOD 30 MIN: CPT | Performed by: FAMILY MEDICINE

## 2024-11-12 PROCEDURE — 99497 ADVNCD CARE PLAN 30 MIN: CPT | Performed by: FAMILY MEDICINE

## 2024-11-12 PROCEDURE — 3078F DIAST BP <80 MM HG: CPT | Performed by: FAMILY MEDICINE

## 2024-11-12 PROCEDURE — 3074F SYST BP LT 130 MM HG: CPT | Performed by: FAMILY MEDICINE

## 2024-11-12 PROCEDURE — 3008F BODY MASS INDEX DOCD: CPT | Performed by: FAMILY MEDICINE

## 2024-11-12 RX ORDER — VARENICLINE TARTRATE 1 MG/1
1 TABLET, FILM COATED ORAL 2 TIMES DAILY
Qty: 60 TABLET | Refills: 5 | Status: SHIPPED | OUTPATIENT
Start: 2024-11-12 | End: 2025-05-11

## 2024-11-12 RX ORDER — TIZANIDINE 4 MG/1
4 TABLET ORAL EVERY 8 HOURS PRN
Qty: 30 TABLET | Refills: 1 | Status: SHIPPED | OUTPATIENT
Start: 2024-11-12

## 2024-11-12 RX ORDER — CARVEDILOL 3.12 MG/1
3.12 TABLET ORAL
Qty: 180 TABLET | Refills: 3 | Status: SHIPPED | OUTPATIENT
Start: 2024-11-12 | End: 2025-11-12

## 2024-11-12 RX ORDER — LISINOPRIL 2.5 MG/1
2.5 TABLET ORAL DAILY
Qty: 90 TABLET | Refills: 3 | Status: SHIPPED | OUTPATIENT
Start: 2024-11-12 | End: 2025-11-12

## 2024-11-12 ASSESSMENT — ENCOUNTER SYMPTOMS
VOICE CHANGE: 0
POLYDIPSIA: 0
BRUISES/BLEEDS EASILY: 0
WHEEZING: 0
NECK STIFFNESS: 0
FLANK PAIN: 0
DEPRESSION: 0
WOUND: 0
EYE REDNESS: 0
DIARRHEA: 0
BLOOD IN STOOL: 0
CONSTIPATION: 0
BACK PAIN: 0
EYE DISCHARGE: 0
PALPITATIONS: 0
CHEST TIGHTNESS: 0
HYPERTENSION: 1
SLEEP DISTURBANCE: 0
CHOKING: 0
UNEXPECTED WEIGHT CHANGE: 0
PHOTOPHOBIA: 0
EYE PAIN: 0
SHORTNESS OF BREATH: 0
RHINORRHEA: 0
ABDOMINAL DISTENTION: 0
OCCASIONAL FEELINGS OF UNSTEADINESS: 0
SPEECH DIFFICULTY: 1
ADENOPATHY: 0
HEMATURIA: 0
APPETITE CHANGE: 0
LOSS OF SENSATION IN FEET: 0
SINUS PRESSURE: 0
ACTIVITY CHANGE: 0
DYSPHORIC MOOD: 0
DYSURIA: 0
TROUBLE SWALLOWING: 0
EYE ITCHING: 0
AGITATION: 0
HALLUCINATIONS: 0
FREQUENCY: 0

## 2024-11-12 ASSESSMENT — ACTIVITIES OF DAILY LIVING (ADL)
DOING_HOUSEWORK: INDEPENDENT
DRESSING: INDEPENDENT
MANAGING_FINANCES: INDEPENDENT
BATHING: INDEPENDENT
TAKING_MEDICATION: INDEPENDENT
GROCERY_SHOPPING: INDEPENDENT

## 2024-11-12 ASSESSMENT — PATIENT HEALTH QUESTIONNAIRE - PHQ9
2. FEELING DOWN, DEPRESSED OR HOPELESS: NOT AT ALL
1. LITTLE INTEREST OR PLEASURE IN DOING THINGS: NOT AT ALL
SUM OF ALL RESPONSES TO PHQ9 QUESTIONS 1 AND 2: 0

## 2024-11-12 NOTE — PROGRESS NOTES
Subjective   Patient ID: Home Salas is a 64 y.o. male who presents for Medicare Annual Wellness Visit Subsequent.        Hypertension  This is a chronic problem. The current episode started more than 1 year ago. The problem is unchanged. The problem is controlled. Pertinent negatives include no palpitations or shortness of breath. There are no associated agents to hypertension. Past treatments include ACE inhibitors and beta blockers. The current treatment provides significant improvement. There are no compliance problems.  Hypertensive end-organ damage includes CVA. There is no history of a hypertension causing med or a thyroid problem.        Review of Systems   Constitutional:  Negative for activity change, appetite change and unexpected weight change.   HENT:  Negative for ear pain, hearing loss, nosebleeds, postnasal drip, rhinorrhea, sinus pressure, sneezing, tinnitus, trouble swallowing and voice change.    Eyes:  Negative for photophobia, pain, discharge, redness, itching and visual disturbance.   Respiratory:  Negative for choking, chest tightness, shortness of breath and wheezing.    Cardiovascular:  Negative for palpitations and leg swelling.   Gastrointestinal:  Negative for abdominal distention, blood in stool, constipation and diarrhea.   Endocrine: Negative for cold intolerance, heat intolerance, polydipsia and polyuria.   Genitourinary:  Negative for dysuria, flank pain, frequency, hematuria and urgency.   Musculoskeletal:  Negative for back pain and neck stiffness.   Skin:  Negative for wound.        Warts bilateral hands  Numerous actinic keratoses bilateral forearms   Allergic/Immunologic: Negative for immunocompromised state.   Neurological:  Positive for speech difficulty.        Chronic expressive aphasia    Hematological:  Negative for adenopathy. Does not bruise/bleed easily.   Psychiatric/Behavioral:  Negative for agitation, behavioral problems, dysphoric mood, hallucinations, self-injury,  "sleep disturbance and suicidal ideas.        Objective   /79 (BP Location: Left arm, Patient Position: Sitting, BP Cuff Size: Large adult)   Pulse 70   Temp 35.7 °C (96.2 °F) (Temporal)   Resp 16   Ht 1.803 m (5' 11\")   Wt 102 kg (225 lb 8 oz)   SpO2 95%   BMI 31.45 kg/m²     Physical Exam  Constitutional:       General: He is not in acute distress.     Appearance: He is not ill-appearing or diaphoretic.   HENT:      Head: Normocephalic and atraumatic.      Right Ear: External ear normal.      Left Ear: External ear normal.      Nose: Nose normal. No rhinorrhea.   Eyes:      General: Lids are normal. No scleral icterus.        Right eye: No discharge.         Left eye: No discharge.      Conjunctiva/sclera: Conjunctivae normal.   Cardiovascular:      Rate and Rhythm: Normal rate and regular rhythm.      Pulses: Normal pulses.      Heart sounds: No murmur heard.  Pulmonary:      Effort: Pulmonary effort is normal. No respiratory distress.      Breath sounds: No decreased breath sounds, wheezing, rhonchi or rales.   Abdominal:      General: Bowel sounds are normal. There is no distension.      Palpations: Abdomen is soft. There is no mass.      Tenderness: There is no abdominal tenderness. There is no guarding or rebound.   Musculoskeletal:         General: No swelling, tenderness or deformity.      Cervical back: No rigidity or tenderness.      Right lower leg: No edema.      Left lower leg: No edema.   Lymphadenopathy:      Cervical: No cervical adenopathy.      Upper Body:      Right upper body: No supraclavicular adenopathy.      Left upper body: No supraclavicular adenopathy.   Skin:     General: Skin is warm and dry.      Coloration: Skin is not jaundiced or pale.      Findings: No erythema, lesion or rash.          Neurological:      General: No focal deficit present.      Mental Status: He is alert and oriented to person, place, and time.      Cranial Nerves: Dysarthria present.      Sensory: No " sensory deficit.      Motor: No weakness or tremor.      Coordination: Romberg sign negative. Coordination normal.      Gait: Gait normal.      Comments: Expressive aphasia   Psychiatric:         Mood and Affect: Mood normal. Affect is not inappropriate.         Behavior: Behavior normal.       Assessment/Plan   Diagnoses and all orders for this visit:  Routine general medical examination at health care facility  -     Follow Up In [facility] - PCP - Medicare Annual; Future  Need for vaccination  -     Flu vaccine, trivalent, preservative free, age 6 months and greater (Fluarix/Fluzone/Flulaval)  -     Follow Up In [facility] - PCP - Medicare Annual; Future  Primary hypertension  -     carvedilol (Coreg) 3.125 mg tablet; Take 1 tablet (3.125 mg) by mouth 2 times daily (morning and late afternoon).  -     lisinopril 2.5 mg tablet; Take 1 tablet (2.5 mg) by mouth once daily.  -     CBC and Auto Differential; Future  -     Comprehensive Metabolic Panel; Future  -     Lipid Panel; Future  -     Magnesium; Future  -     TSH with reflex to Free T4 if abnormal; Future  -     Follow Up In [facility] - PCP - Medicare Annual; Future  Chronic left-sided low back pain without sciatica  -     tiZANidine (Zanaflex) 4 mg tablet; Take 1 tablet (4 mg) by mouth every 8 hours if needed for muscle spasms.  -     Follow Up In [facility] - PCP - Medicare Annual; Future  Advanced directives, counseling/discussion  -     Full code  -     Follow Up In [facility] - PCP - Medicare Annual; Future  Tobacco use disorder  -     varenicline (Chantix) 1 mg tablet; Take 1 tablet (1 mg) by mouth 2 times a day. Take with full glass of water.  -     Follow Up In [facility] - PCP - Medicare Annual; Future  Chronic obstructive pulmonary disease, unspecified COPD type (Multi)  -     CBC and Auto Differential; Future  -     Magnesium; Future  -     Follow Up In [facility]  Medicare Annual; Future  Prostate cancer (Multi)  -     Referral to Urology; Future  -     Follow Up In [facility] - PCP - Medicare Annual; Future

## 2024-12-05 ENCOUNTER — OFFICE VISIT (OUTPATIENT)
Dept: UROLOGY | Facility: CLINIC | Age: 64
End: 2024-12-05
Payer: COMMERCIAL

## 2024-12-05 VITALS — DIASTOLIC BLOOD PRESSURE: 86 MMHG | TEMPERATURE: 98.3 F | SYSTOLIC BLOOD PRESSURE: 170 MMHG | HEART RATE: 78 BPM

## 2024-12-05 DIAGNOSIS — N40.1 BENIGN PROSTATIC HYPERPLASIA WITH URINARY FREQUENCY: ICD-10-CM

## 2024-12-05 DIAGNOSIS — R35.0 BENIGN PROSTATIC HYPERPLASIA WITH URINARY FREQUENCY: ICD-10-CM

## 2024-12-05 DIAGNOSIS — C61 PROSTATE CANCER (MULTI): Primary | ICD-10-CM

## 2024-12-05 PROCEDURE — 4004F PT TOBACCO SCREEN RCVD TLK: CPT | Performed by: UROLOGY

## 2024-12-05 PROCEDURE — 99214 OFFICE O/P EST MOD 30 MIN: CPT | Performed by: UROLOGY

## 2024-12-05 PROCEDURE — 3077F SYST BP >= 140 MM HG: CPT | Performed by: UROLOGY

## 2024-12-05 PROCEDURE — 3079F DIAST BP 80-89 MM HG: CPT | Performed by: UROLOGY

## 2024-12-05 PROCEDURE — G2211 COMPLEX E/M VISIT ADD ON: HCPCS | Performed by: UROLOGY

## 2024-12-05 RX ORDER — ACETAMINOPHEN 325 MG/1
975 TABLET ORAL ONCE
OUTPATIENT
Start: 2024-12-05 | End: 2024-12-05

## 2024-12-05 RX ORDER — TADALAFIL 5 MG/1
5 TABLET ORAL DAILY
Qty: 90 TABLET | Refills: 3 | Status: SHIPPED | OUTPATIENT
Start: 2024-12-05 | End: 2025-12-05

## 2024-12-05 RX ORDER — CEFAZOLIN SODIUM 2 G/100ML
2 INJECTION, SOLUTION INTRAVENOUS ONCE
OUTPATIENT
Start: 2024-12-05 | End: 2024-12-05

## 2024-12-05 ASSESSMENT — PATIENT HEALTH QUESTIONNAIRE - PHQ9
1. LITTLE INTEREST OR PLEASURE IN DOING THINGS: NOT AT ALL
2. FEELING DOWN, DEPRESSED OR HOPELESS: NOT AT ALL
SUM OF ALL RESPONSES TO PHQ9 QUESTIONS 1 AND 2: 0

## 2024-12-05 ASSESSMENT — ENCOUNTER SYMPTOMS
DEPRESSION: 0
LOSS OF SENSATION IN FEET: 0
OCCASIONAL FEELINGS OF UNSTEADINESS: 0

## 2024-12-05 NOTE — PROGRESS NOTES
PRIOR NOTES  64-year-old male seeing me for prostate cancer  PMH: Prior history of CVA with residual expressive aphasia, history of EtOH (not currently)  IPSS 11    PROSTATE CA TIMELINE  - 06/2022 Initial PSA 6.48  Clinically nonpalpable disease  MRI prostate 6/21/2022: 39 g gland, PSA density 0.17, PI-RADS 2 target no nodes  OR 8/9/2022-uncomplicated template biopsy   - Pathology: Single core of grade group 1 disease, less than 5%  NCCN low risk (not very low risk due to PSA density only)   PSA 3/2023 - 7.66  -- LOST TO FU  11/8/24 - PSA 10.16  12/5/24 - re-established care    FUV 4/6/23 -here for active surveillance follow-up. He also would like to discuss stopping tamsulosin, reports he has no issues with urination and is interested in reducing the number of medications he is on.    UPDATED SUBJECTIVE HISTORY  12/05/24 -patient is reestablishing care for prostate cancer surveillance.  He has had no significant changes in his health.  He ran out of his tamsulosin, denies any weak stream or hesitancy.  He does endorse some urgency, frequency.  No urge incontinence.  The urge is bothersome and he has frequent nocturia    Past Medical History  He has a past medical history of Encounter for immunization, Encounter for screening for malignant neoplasm of colon (10/10/2019), Encounter for screening for malignant neoplasm of prostate (10/06/2021), Encounter for screening for malignant neoplasm of prostate (09/05/2019), Immunization not carried out because of patient refusal (10/06/2021), Other abnormalities of gait and mobility (09/27/2019), Other cardiomyopathies (09/05/2019), Personal history of other diseases of the musculoskeletal system and connective tissue (08/07/2020), Personal history of other specified conditions (08/18/2020), Personal history of other specified conditions (09/18/2019), Problem related to lifestyle, unspecified (09/05/2019), and Unspecified chronic gastritis with bleeding  (09/05/2019).    Surgical History  He has a past surgical history that includes Other surgical history (09/05/2019); Other surgical history (09/05/2019); Other surgical history (09/05/2019); and Other surgical history (10/10/2019).     Social History  He reports that he has been smoking cigarettes. He has a 22.5 pack-year smoking history. He has never used smokeless tobacco. He reports that he does not drink alcohol and does not use drugs.    Family History  No family history on file.     Allergies  Patient has no known allergies.    ROS: 12 system review was completed and is negative with the exception of those signs and symptoms noted in the history of present illness: A 12 system review was completed and is negative with the exception of those signs and symptoms noted in the history of present illness.     Exam:  General: in NAD, appears stated age  Head: normocephalic, atraumatic  Respiratory: normal effort, no use of accessory muscles  Cardiovascular: no edema noted  Skin: normal turgor, no rashes  Neurologic: grossly intact, oriented to person/place/time  Psychiatric: mode and affect appropriate     Last Recorded Vitals  Blood pressure 170/86, pulse 78, temperature 36.8 °C (98.3 °F), temperature source Oral.    Lab Results   Component Value Date    PSASCREEN 10.16 (H) 11/05/2024    CREATININE 0.73 03/23/2023    HGB 15.4 03/23/2023         ASSESSMENT/PLAN:  # Nocturia, urinary frequency and urgency, OAB  -Start tadalafil 5 mg daily.  I discussed side effects  -I also discussed anticholinergics, after hearing about the common side effects he was not interested in starting therapy    # Prostate cancer  -Continue active surveillance  -Patient needs updated MRI and prostate biopsy    Sridhar Villafana MD

## 2024-12-30 ENCOUNTER — HOSPITAL ENCOUNTER (OUTPATIENT)
Dept: RADIOLOGY | Facility: HOSPITAL | Age: 64
Discharge: HOME | End: 2024-12-30
Payer: COMMERCIAL

## 2024-12-30 DIAGNOSIS — C61 PROSTATE CANCER (MULTI): ICD-10-CM

## 2024-12-30 PROCEDURE — 2550000001 HC RX 255 CONTRASTS: Performed by: UROLOGY

## 2024-12-30 PROCEDURE — A9575 INJ GADOTERATE MEGLUMI 0.1ML: HCPCS | Performed by: UROLOGY

## 2024-12-30 PROCEDURE — 72197 MRI PELVIS W/O & W/DYE: CPT

## 2024-12-30 RX ORDER — GADOTERATE MEGLUMINE 376.9 MG/ML
0.2 INJECTION INTRAVENOUS
Status: COMPLETED | OUTPATIENT
Start: 2024-12-30 | End: 2024-12-30

## 2024-12-30 RX ADMIN — GADOTERATE MEGLUMINE 18 ML: 376.9 INJECTION INTRAVENOUS at 13:48

## 2025-01-13 ENCOUNTER — ANESTHESIA EVENT (OUTPATIENT)
Dept: OPERATING ROOM | Facility: CLINIC | Age: 65
End: 2025-01-13
Payer: COMMERCIAL

## 2025-01-13 PROBLEM — G89.29 CHRONIC LOW BACK PAIN: Status: ACTIVE | Noted: 2025-01-13

## 2025-01-13 PROBLEM — M54.50 CHRONIC LOW BACK PAIN: Status: ACTIVE | Noted: 2025-01-13

## 2025-01-13 NOTE — ANESTHESIA PREPROCEDURE EVALUATION
Patient: Home Salas    Procedure Information       Date/Time: 01/14/25 1135    Procedure: BIOPSY, PROSTATE, WITH IMAGING GUIDANCE - [place] for uronav fusion biopsy    Location: [facility] OR 03 / Virtual [facility] OR    Surgeons: Sridhar Villafana MD            Relevant Problems   Cardiac  Hx non-obstruct cardiomyopathy    (+) Hypertension      Pulmonary  Heavy smoker 25pk/yr hx, last cig Xmas   (+) Chronic obstructive pulmonary disease (Multi)   (+) Dyspnea on exertion      Neuro  Hx CVA w/ residual expressive aphasia, no weakness  Hx ETOH encephalopathy   Vertigo/Dizziness  Unsteady gait   (+) CVA (cerebral vascular accident) (Multi)      GI  Hx of GIB s/p 2u pRBC    (+) GI bleed      /Renal   (+) Benign prostatic hyperplasia with urinary frequency   (+) Prostate cancer (Multi)      Liver   (+) Alcoholic liver disease      Endocrine   (+) Obesity      Hematology  Hx of blood clot   (+) DVT (deep venous thrombosis) (Multi)   (+) History of blood transfusion   (+) Transfusion of blood product refused for Jehovah's witness reason      Musculoskeletal  Hx of chronic left-sided low back pain without sciatica   (+) Chronic low back pain      Skin   Warts/actinic keratoses bilateral forearms/hands         Clinical information reviewed:   Tobacco  Allergies  Meds   Med Hx  Surg Hx   Fam Hx  Soc Hx        NPO Detail:  No data recorded     Physical Exam    Airway  Mallampati: I  TM distance: >3 FB  Neck ROM: full     Cardiovascular - normal exam     Dental   (+) upper dentures, lower dentures     Pulmonary - normal exam  Breath sounds clear to auscultation     Abdominal   (+) obese  Abdomen: soft  Bowel sounds: normal           Anesthesia Plan    History of general anesthesia?: yes  History of complications of general anesthesia?: no    ASA 3     general     The patient is a current smoker.  Patient was previously instructed to abstain from smoking on day of procedure.  Patient did not smoke on day of  procedure.    intravenous induction   Postoperative administration of opioids is intended.  Trial extubation is planned.  Anesthetic plan and risks discussed with patient.    Plan discussed with attending.       Banner Transposition Flap Text: The defect edges were debeveled with a #15 scalpel blade.  Given the location of the defect and the proximity to free margins a Banner transposition flap was deemed most appropriate.  Using a sterile surgical marker, an appropriate flap drawn around the defect. The area thus outlined was incised deep to adipose tissue with a #15 scalpel blade.  The skin margins were undermined to an appropriate distance in all directions utilizing iris scissors.

## 2025-01-14 ENCOUNTER — HOSPITAL ENCOUNTER (OUTPATIENT)
Facility: CLINIC | Age: 65
Setting detail: OUTPATIENT SURGERY
Discharge: HOME | End: 2025-01-14
Attending: UROLOGY | Admitting: UROLOGY
Payer: COMMERCIAL

## 2025-01-14 ENCOUNTER — ANESTHESIA (OUTPATIENT)
Dept: OPERATING ROOM | Facility: CLINIC | Age: 65
End: 2025-01-14
Payer: COMMERCIAL

## 2025-01-14 VITALS
OXYGEN SATURATION: 93 % | HEART RATE: 60 BPM | RESPIRATION RATE: 16 BRPM | BODY MASS INDEX: 30.1 KG/M2 | TEMPERATURE: 97.5 F | HEIGHT: 72 IN | WEIGHT: 222.22 LBS | DIASTOLIC BLOOD PRESSURE: 88 MMHG | SYSTOLIC BLOOD PRESSURE: 139 MMHG

## 2025-01-14 DIAGNOSIS — C61 PROSTATE CANCER (MULTI): Primary | ICD-10-CM

## 2025-01-14 PROBLEM — Z53.1 TRANSFUSION OF BLOOD PRODUCT REFUSED FOR RELIGIOUS REASON: Status: ACTIVE | Noted: 2025-01-14

## 2025-01-14 PROBLEM — E66.9 OBESITY: Status: ACTIVE | Noted: 2025-01-14

## 2025-01-14 PROBLEM — I82.409 DVT (DEEP VENOUS THROMBOSIS) (MULTI): Status: ACTIVE | Noted: 2025-01-14

## 2025-01-14 PROBLEM — K70.9 ALCOHOLIC LIVER DISEASE: Status: ACTIVE | Noted: 2025-01-14

## 2025-01-14 PROBLEM — Z92.89 HISTORY OF BLOOD TRANSFUSION: Status: ACTIVE | Noted: 2025-01-14

## 2025-01-14 PROBLEM — I63.9 CVA (CEREBRAL VASCULAR ACCIDENT) (MULTI): Status: ACTIVE | Noted: 2025-01-14

## 2025-01-14 PROBLEM — K92.2 GI BLEED: Status: ACTIVE | Noted: 2025-01-14

## 2025-01-14 PROBLEM — R06.09 DYSPNEA ON EXERTION: Status: ACTIVE | Noted: 2025-01-14

## 2025-01-14 PROCEDURE — 55700 PR PROSTATE NEEDLE BIOPSY ANY APPROACH: CPT | Performed by: UROLOGY

## 2025-01-14 PROCEDURE — 7100000010 HC PHASE TWO TIME - EACH INCREMENTAL 1 MINUTE: Performed by: UROLOGY

## 2025-01-14 PROCEDURE — 88344 IMHCHEM/IMCYTCHM EA MLT ANTB: CPT | Performed by: PATHOLOGY

## 2025-01-14 PROCEDURE — 2500000004 HC RX 250 GENERAL PHARMACY W/ HCPCS (ALT 636 FOR OP/ED)

## 2025-01-14 PROCEDURE — 88344 IMHCHEM/IMCYTCHM EA MLT ANTB: CPT | Mod: TC,SUR,ELYLAB,MUE | Performed by: UROLOGY

## 2025-01-14 PROCEDURE — 2500000004 HC RX 250 GENERAL PHARMACY W/ HCPCS (ALT 636 FOR OP/ED): Mod: JG,TB | Performed by: UROLOGY

## 2025-01-14 PROCEDURE — C1819 TISSUE LOCALIZATION-EXCISION: HCPCS | Performed by: UROLOGY

## 2025-01-14 PROCEDURE — 3700000002 HC GENERAL ANESTHESIA TIME - EACH INCREMENTAL 1 MINUTE: Performed by: UROLOGY

## 2025-01-14 PROCEDURE — 7100000009 HC PHASE TWO TIME - INITIAL BASE CHARGE: Performed by: UROLOGY

## 2025-01-14 PROCEDURE — 2720000007 HC OR 272 NO HCPCS: Performed by: UROLOGY

## 2025-01-14 PROCEDURE — A55706 PR BIOPSY OF PROSTATE,NEEDLE,TRANSPERINEAL: Performed by: NURSE ANESTHETIST, CERTIFIED REGISTERED

## 2025-01-14 PROCEDURE — 3600000009 HC OR TIME - EACH INCREMENTAL 1 MINUTE - PROCEDURE LEVEL FOUR: Performed by: UROLOGY

## 2025-01-14 PROCEDURE — 2500000004 HC RX 250 GENERAL PHARMACY W/ HCPCS (ALT 636 FOR OP/ED): Performed by: STUDENT IN AN ORGANIZED HEALTH CARE EDUCATION/TRAINING PROGRAM

## 2025-01-14 PROCEDURE — 2500000004 HC RX 250 GENERAL PHARMACY W/ HCPCS (ALT 636 FOR OP/ED): Performed by: UROLOGY

## 2025-01-14 PROCEDURE — 76942 ECHO GUIDE FOR BIOPSY: CPT | Performed by: UROLOGY

## 2025-01-14 PROCEDURE — 3700000001 HC GENERAL ANESTHESIA TIME - INITIAL BASE CHARGE: Performed by: UROLOGY

## 2025-01-14 PROCEDURE — 2500000005 HC RX 250 GENERAL PHARMACY W/O HCPCS: Performed by: UROLOGY

## 2025-01-14 PROCEDURE — 3600000004 HC OR TIME - INITIAL BASE CHARGE - PROCEDURE LEVEL FOUR: Performed by: UROLOGY

## 2025-01-14 PROCEDURE — G0416 PROSTATE BIOPSY, ANY MTHD: HCPCS | Performed by: PATHOLOGY

## 2025-01-14 RX ORDER — PROPOFOL 10 MG/ML
INJECTION, EMULSION INTRAVENOUS CONTINUOUS PRN
Status: DISCONTINUED | OUTPATIENT
Start: 2025-01-14 | End: 2025-01-14

## 2025-01-14 RX ORDER — LIDOCAINE HYDROCHLORIDE 20 MG/ML
INJECTION, SOLUTION INFILTRATION; PERINEURAL AS NEEDED
Status: DISCONTINUED | OUTPATIENT
Start: 2025-01-14 | End: 2025-01-14

## 2025-01-14 RX ORDER — SODIUM CHLORIDE 0.9 G/100ML
INJECTION, SOLUTION IRRIGATION AS NEEDED
Status: DISCONTINUED | OUTPATIENT
Start: 2025-01-14 | End: 2025-01-14 | Stop reason: HOSPADM

## 2025-01-14 RX ORDER — HYDRALAZINE HYDROCHLORIDE 20 MG/ML
5 INJECTION INTRAMUSCULAR; INTRAVENOUS EVERY 30 MIN PRN
Status: DISCONTINUED | OUTPATIENT
Start: 2025-01-14 | End: 2025-01-14 | Stop reason: HOSPADM

## 2025-01-14 RX ORDER — LIDOCAINE HYDROCHLORIDE 10 MG/ML
0.1 INJECTION, SOLUTION EPIDURAL; INFILTRATION; INTRACAUDAL; PERINEURAL ONCE
Status: DISCONTINUED | OUTPATIENT
Start: 2025-01-14 | End: 2025-01-14 | Stop reason: HOSPADM

## 2025-01-14 RX ORDER — BUPIVACAINE HYDROCHLORIDE 7.5 MG/ML
INJECTION, SOLUTION EPIDURAL; RETROBULBAR AS NEEDED
Status: DISCONTINUED | OUTPATIENT
Start: 2025-01-14 | End: 2025-01-14 | Stop reason: HOSPADM

## 2025-01-14 RX ORDER — FENTANYL CITRATE 50 UG/ML
INJECTION, SOLUTION INTRAMUSCULAR; INTRAVENOUS AS NEEDED
Status: DISCONTINUED | OUTPATIENT
Start: 2025-01-14 | End: 2025-01-14

## 2025-01-14 RX ORDER — SODIUM CHLORIDE, SODIUM LACTATE, POTASSIUM CHLORIDE, CALCIUM CHLORIDE 600; 310; 30; 20 MG/100ML; MG/100ML; MG/100ML; MG/100ML
100 INJECTION, SOLUTION INTRAVENOUS CONTINUOUS
Status: SHIPPED | OUTPATIENT
Start: 2025-01-14 | End: 2025-01-14

## 2025-01-14 RX ORDER — ONDANSETRON HYDROCHLORIDE 2 MG/ML
INJECTION, SOLUTION INTRAVENOUS AS NEEDED
Status: DISCONTINUED | OUTPATIENT
Start: 2025-01-14 | End: 2025-01-14

## 2025-01-14 RX ORDER — ACETAMINOPHEN 325 MG/1
650 TABLET ORAL EVERY 4 HOURS PRN
Status: DISCONTINUED | OUTPATIENT
Start: 2025-01-14 | End: 2025-01-14 | Stop reason: HOSPADM

## 2025-01-14 RX ORDER — LABETALOL HYDROCHLORIDE 5 MG/ML
5 INJECTION, SOLUTION INTRAVENOUS ONCE AS NEEDED
Status: DISCONTINUED | OUTPATIENT
Start: 2025-01-14 | End: 2025-01-14 | Stop reason: HOSPADM

## 2025-01-14 RX ORDER — CEFAZOLIN SODIUM 2 G/50ML
2 SOLUTION INTRAVENOUS ONCE
Status: COMPLETED | OUTPATIENT
Start: 2025-01-14 | End: 2025-01-14

## 2025-01-14 RX ORDER — ALBUTEROL SULFATE 0.83 MG/ML
2.5 SOLUTION RESPIRATORY (INHALATION) ONCE AS NEEDED
Status: DISCONTINUED | OUTPATIENT
Start: 2025-01-14 | End: 2025-01-14 | Stop reason: HOSPADM

## 2025-01-14 RX ORDER — HYDROMORPHONE HYDROCHLORIDE 1 MG/ML
0.2 INJECTION, SOLUTION INTRAMUSCULAR; INTRAVENOUS; SUBCUTANEOUS EVERY 5 MIN PRN
Status: DISCONTINUED | OUTPATIENT
Start: 2025-01-14 | End: 2025-01-14 | Stop reason: HOSPADM

## 2025-01-14 RX ORDER — ACETAMINOPHEN 325 MG/1
975 TABLET ORAL ONCE
Status: DISCONTINUED | OUTPATIENT
Start: 2025-01-14 | End: 2025-01-14 | Stop reason: HOSPADM

## 2025-01-14 RX ORDER — ONDANSETRON HYDROCHLORIDE 2 MG/ML
4 INJECTION, SOLUTION INTRAVENOUS ONCE AS NEEDED
Status: DISCONTINUED | OUTPATIENT
Start: 2025-01-14 | End: 2025-01-14 | Stop reason: HOSPADM

## 2025-01-14 RX ADMIN — SODIUM CHLORIDE: 9 INJECTION, SOLUTION INTRAVENOUS at 11:48

## 2025-01-14 RX ADMIN — LIDOCAINE HYDROCHLORIDE 25 MG: 20 INJECTION, SOLUTION INFILTRATION; PERINEURAL at 11:46

## 2025-01-14 RX ADMIN — PROPOFOL 100 MCG/KG/MIN: 10 INJECTION, EMULSION INTRAVENOUS at 11:46

## 2025-01-14 RX ADMIN — SODIUM CHLORIDE, POTASSIUM CHLORIDE, SODIUM LACTATE AND CALCIUM CHLORIDE 100 ML/HR: 600; 310; 30; 20 INJECTION, SOLUTION INTRAVENOUS at 12:23

## 2025-01-14 RX ADMIN — PROPOFOL 20 MG: 10 INJECTION, EMULSION INTRAVENOUS at 11:59

## 2025-01-14 RX ADMIN — FENTANYL CITRATE 25 MCG: 50 INJECTION, SOLUTION INTRAMUSCULAR; INTRAVENOUS at 11:58

## 2025-01-14 RX ADMIN — PROPOFOL 50 MG: 10 INJECTION, EMULSION INTRAVENOUS at 11:49

## 2025-01-14 RX ADMIN — CEFAZOLIN SODIUM 2 G: 2 SOLUTION INTRAVENOUS at 11:48

## 2025-01-14 RX ADMIN — PROPOFOL 20 MG: 10 INJECTION, EMULSION INTRAVENOUS at 11:47

## 2025-01-14 RX ADMIN — PROPOFOL 30 MG: 10 INJECTION, EMULSION INTRAVENOUS at 12:00

## 2025-01-14 RX ADMIN — ONDANSETRON 4 MG: 2 INJECTION INTRAMUSCULAR; INTRAVENOUS at 11:48

## 2025-01-14 SDOH — HEALTH STABILITY: MENTAL HEALTH: CURRENT SMOKER: 1

## 2025-01-14 ASSESSMENT — COLUMBIA-SUICIDE SEVERITY RATING SCALE - C-SSRS
1. IN THE PAST MONTH, HAVE YOU WISHED YOU WERE DEAD OR WISHED YOU COULD GO TO SLEEP AND NOT WAKE UP?: NO
2. HAVE YOU ACTUALLY HAD ANY THOUGHTS OF KILLING YOURSELF?: NO
6. HAVE YOU EVER DONE ANYTHING, STARTED TO DO ANYTHING, OR PREPARED TO DO ANYTHING TO END YOUR LIFE?: NO

## 2025-01-14 ASSESSMENT — PAIN SCALES - GENERAL
PAINLEVEL_OUTOF10: 0 - NO PAIN

## 2025-01-14 ASSESSMENT — PAIN - FUNCTIONAL ASSESSMENT
PAIN_FUNCTIONAL_ASSESSMENT: 0-10

## 2025-01-14 NOTE — ANESTHESIA POSTPROCEDURE EVALUATION
Patient: Home Salas    Procedure Summary       Date: 01/14/25 Room / Location: [facility] / [facility]    Anesthesia Start: 1143 Anesthesia Stop: 1215    Procedure: BIOPSY, PROSTATE, WITH IMAGING GUIDANCE Diagnosis:       Prostate cancer (Multi)      (Prostate cancer (Multi) [C61])    Surgeons: Sridhar Villafana MD Responsible Provider: VICKI Lafleur    Anesthesia Type: MAC ASA Status: 3            Anesthesia Type: MAC    Vitals Value Taken Time   /88 01/14/25 1245   Temp 36.4 °C (97.5 °F) 01/14/25 1245   Pulse 60 01/14/25 1245   Resp 16 01/14/25 1245   SpO2 93 % 01/14/25 1245       Anesthesia Post Evaluation    Patient location during evaluation: PACU  Patient participation: complete - patient participated  Level of consciousness: awake  Pain management: adequate  Multimodal analgesia pain management approach  Airway patency: patent  Two or more strategies used to mitigate risk of obstructive sleep apnea  Cardiovascular status: acceptable  Respiratory status: face mask  Hydration status: acceptable  Postoperative Nausea and Vomiting: none        There were no known notable events for this encounter.

## 2025-01-14 NOTE — OP NOTE
BIOPSY, PROSTATE, WITH IMAGING GUIDANCE Operative Note     Date: 2025  OR Location: [facility] OR    Name: Home Salas, : 1960, Age: 64 y.o., MRN: [id number], Sex: male    Diagnosis  Pre-op Diagnosis      * Prostate cancer (Multi) [C61] Post-op Diagnosis     * Prostate cancer (Multi) [C61]     Procedures  BIOPSY, PROSTATE, WITH IMAGING GUIDANCE  63912 - [place] PROSTATE NEEDLE BIOPSY ANY APPROACH      Surgeons      * Sridhar Villafana - Primary    Resident/Fellow/Other Assistant:  Surgeons and Role:  * No surgeons found with a matching role *    Staff:   Circulator: Delfino Mckay Person: Taan  Circulator: Ernesto  Circulator: Shi    Anesthesia Staff: CRNA: MULUGETA Lafleur-SAURAV  SRNA: Yaimle Zuleta    Procedure Summary  Anesthesia: Monitor Anesthesia Care  ASA: III  Estimated Blood Loss: 3mL  Intra-op Medications:   Administrations occurring from 1135 to 1220 on 25:   Medication Name Total Dose   bupivacaine PF (Marcaine) injection 0.75 % 30 mL   sodium chloride 0.9 % irrigation solution 150 mL   fentaNYL PF 0.05 mg/mL 25 mcg   lidocaine (Xylocaine) 2 % 25 mg   ondansetron (Zofran) 2 mg/mL injection 4 mg   propofol (Diprivan) infusion 10 mg/mL 347.5 mg   NaCl 0.9 % bolus Cannot be calculated   ceFAZolin (Ancef) 2 g in dextrose (iso) IV 50 mL 2 g              Anesthesia Record               Intraprocedure I/O Totals          Intake    NaCl 0.9 % bolus 10.00 mL    Propofol Drip 0.00 mL    The total shown is the total volume documented since Anesthesia Start was filed.    Total Intake 10 mL          Specimen:   ID Type Source Tests Collected by Time   1 : RIGHT POSTERIOR MEDIAL Tissue PROSTATE NEEDLE BIOPSY RIGHT SURGICAL PATHOLOGY EXAM Sridhar Villafana MD 2025 1138   2 : LEFT ANTERIOR Tissue PROSTATE NEEDLE BIOPSY LEFT SURGICAL PATHOLOGY EXAM Sridhar Villafana MD 2025 1138   3 : LEFT BASE Tissue PROSTATE NEEDLE BIOPSY LEFT SURGICAL PATHOLOGY EXAM Sridhar Villafana MD 2025 1138   4 :  LEFT POSTERIOR LATERAL Tissue PROSTATE NEEDLE BIOPSY LEFT SURGICAL PATHOLOGY EXAM Sridhar Villafana MD 1/14/2025 1138   5 : LEFT POSTERIOR MEDIAL Tissue PROSTATE NEEDLE BIOPSY LEFT SURGICAL PATHOLOGY EXAM Sridhar Villafana MD 1/14/2025 1138   6 : RIGHT ANTERIOR Tissue PROSTATE NEEDLE BIOPSY RIGHT SURGICAL PATHOLOGY EXAM Sridhar Villafana MD 1/14/2025 1138   7 : RIGHT BASE Tissue PROSTATE NEEDLE BIOPSY RIGHT SURGICAL PATHOLOGY EXAM Sridhar Villafana MD 1/14/2025 1138   8 : RIGHT POSTERIOR LATERAL Tissue PROSTATE NEEDLE BIOPSY RIGHT SURGICAL PATHOLOGY EXAM Sridhar Villafana MD 1/14/2025 1138   9 : left posterior region point of interest Tissue PROSTATE NEEDLE BIOPSY LEFT SURGICAL PATHOLOGY EXAM Sridhar Villafana MD 1/14/2025 1145      Operative Indications: 64 year old male w/ dx of prostate cancer on active surveillance, who presents for transperineal biopsies.  He underwent preoperative MRI of his prostate which revealed a suspicious lesion which was marked for MRI fusion biopsy.  The patient was counseled regarding the risks, benefits, alternatives, equipment, and personnel involved and consented to proceed with surgical intervention.    Operative Findings: Uncomplicated transperineal biopsy with MRI fusion guidance.  Mild induration of the right side of his prostate not frankly concerning for cancer, clinically nonpalpable    Operative Procedure:   The patient was correctly identified and the operative plan was confirmed with the patient and the operative team. A weight appropriate dose of prophylactic antibiotics was administered intravenously prior to the procedure and sequential compression devices were applied to the lower extremities and activated prior to induction of anesthesia. The patient was placed in supine position.  Monitored anesthesia was induced. The patient was repositioned in dorsal lithotomy position. All pressure points were padded per protocol.    A digital rectal exam revealed: Mild induration of the  right Morgan gland, overall not frankly concerning for palpable cancer    The operative area was then prepped and draped in the usual sterile fashion.    The transrectal ultrasound probe was inserted into the rectum.  The perineal skin was infiltrated with 5 cc of 0.25% Marcaine plain on both sides of the perineal raphae.  A bilateral pudendal nerve block was performed using the precision point guide.  Using a 22-gauge, 7 inch spinal needle 10 cc of 0.25% Marcaine were infiltrated on either side of the pelvic musculature towards the lateral aspect of the prostate as well as the needle tracks bilaterally.  In total, 30 cc of Marcaine were used.    There was a hypoechoic lesion that corresponded with the suspicious lesion seen on MRI.  MRI fusion was then performed using the Saqina device.  While prostate mapping was being done, the template biopsies were obtained.    An 18-gauge core biopsy needle was then used to obtain 2 biopsies from each site: Posterior medial, posterior lateral, prostate base, anterior prostate bilaterally.  In total, 16 biopsy cores were taken.    We then targeted the suspicious area using fusion technology and took several samples from this area.    Counts were correct. Sign-out was performed with the surgical team confirming the specimen listed below. The patient tolerated the procedure well, emerged from anesthesia without incident, and was transferred to PACU in stable condition.     I (Sridhar Villafana MD) performed the procedure    Sridhar Villafana  Phone Number: [phone]

## 2025-01-14 NOTE — DISCHARGE INSTRUCTIONS
Post-Procedure Instructions for Transperineal Prostate Biopsy   Procedure Overview:   You have had a transperineal prostate biopsy.   We will call you to discuss your pathology results. Results typically take 7-10 business days. If you do not hear from us within 10 days, please reach out to us via the methods listed at the bottom of this handout.     What to Expect:   Blood-tinged urine with/without clots for 24-72 hours.   Blood in the ejaculate for 4-6 weeks.     When to Call:   Call your doctor immediately if you experience any of the following:    You are unable to urinate in 6-8 hours after the biopsy.    Fever above 101 degrees (F) or Chills    Passing excessive clots in urine or stool     Pain Control:   Tylenol should be adequate     General Instructions:    Resume normal diet.    If you take Aspirin, resume 3 days after biopsy if you do not see blood in your urine.    Drink 6-8 glasses of water the rest of the day to dilute the urine and to prevent clot formation.    No alcoholic beverages for 24 hours after your biopsy.    No straining or heavy lifting for 5 days after your biopsy.     DR. VALENTIN'S CONTACT INFORMATION   For non-urgent issues, please send our office a message via [employer], this is often easier and more convenient for you than calling the office. You should have received an invitation to sign-up for [employer] in your email upon registration.   For appointments: [phone], option 1 -> option 3 -> option 9     For questions/issues/concerns during business hours: [phone] - this number will direct you to the voicemail for Dr. Valentin's , which is frequently checked during business hours.   For after-hours issues: [phone], this will direct you to our answering service or the resident physician on call if needed.   For medical emergencies, please call 911 or proceed to your nearest emergency room.

## 2025-01-14 NOTE — H&P
-[facility] [place] Hospitalist Progress Note    Subjective  Patient with continued fevers.  He denies any symptoms.  He states that his pain is better.  Reports that he had a bloody bowel movement at 10 PM last night.  Denies any lightheadedness.  Reports shortness of breath when walking.  Denies any chest pain.  Azeri video  was used during the visit.      Assessment/Plan  26 year old Tajik-speaking male who presented with abdominal pain and nausea and vomiting.    Acute necrotizing pancreatitis  Acute biliary obstruction  Lipase 4.6K on admission with , , T.bili 1.1. Triglycerides 133.  Viral hepatitis panel negative  CTAP with evidence of severe pancreatitis. Mild CBD dilation noted.  RUQ US with cholelithiasis with no secondary evidence of acute cholecystitis, mild dilatation of the CBD (1.1 cm), without any visible shadowing choledocholithiasis.  MRCP with findings of acute necrotizing pancreatitis with associated inflammatory changes. No well loculated peripancreatic fluid collection. Abnormal biliary duct dilation with abrupt cut off of the distal common bile duct suspicious for obstruction and intraluminal filling defects within the CBD suspicious for sludge/stones or other intraluminal pathology   GI consulted  S/p ERCP with findings of a stricture at the CBD from pancreatic edema as well as a CBD stone present as well. Biliary cannulation with stone extraction was performed and stent was placed (11/12)  Repeat CT of the abdomen and pelvis with increased volume of peripancreatic fluid.  No obvious abscess noted.  Biliary stent appears in good position.  Repeat CT abdomen and pelvis (11/16) with reactive/inflammatory fluid along the posterior paracolic gutters and retroperitoneum and in the pelvis.  ID consulted.   Given IVFs.   Now IV fluids has been discontinued  due to pulmonary edema and pleural effusion.  On IV Morphine PRN.  On IV abx per ID (11/13 - ).   Recurrent fevers.  History Of Present Illness  64-year-old male with prostate cancer here for surveillance prostate biopsy using MRI guidance.    Past Medical History  Past Medical History:   Diagnosis Date    Cerebral vascular accident (Multi)     COPD (chronic obstructive pulmonary disease) (Multi)     Encounter for immunization     Encounter for immunization    Encounter for screening for malignant neoplasm of colon 10/10/2019    Colon cancer screening    Encounter for screening for malignant neoplasm of prostate 10/06/2021    Screening for prostate cancer    Encounter for screening for malignant neoplasm of prostate 09/05/2019    Special screening examination for neoplasm of prostate    Hypertension     Immunization not carried out because of patient refusal 10/06/2021    Refused influenza vaccine    Other abnormalities of gait and mobility 09/27/2019    Decreased mobility    Other cardiomyopathies 09/05/2019    Cardiomyopathy, nonischemic    Personal history of other diseases of the musculoskeletal system and connective tissue 08/07/2020    History of low back pain    Personal history of other specified conditions 08/18/2020    History of vertigo    Personal history of other specified conditions 09/18/2019    History of dizziness    Problem related to lifestyle, unspecified 09/05/2019    Lifestyle problems    Prostate cancer (Multi)     Unspecified chronic gastritis with bleeding 09/05/2019    Gastrointestinal hemorrhage associated with chronic gastritis        Surgical History  Past Surgical History:   Procedure Laterality Date    CLAVICLE SURGERY      OTHER SURGICAL HISTORY  09/05/2019    Foot surgery    OTHER SURGICAL HISTORY  09/05/2019    Elbow fracture repair    OTHER SURGICAL HISTORY  09/05/2019    Back surgery    OTHER SURGICAL HISTORY  10/10/2019    Colonoscopy        Social History  He reports that he has been smoking cigarettes. He has a 22.5 pack-year smoking history. He has never used smokeless tobacco. He reports  "that he does not drink alcohol and does not use drugs.    Family History  No family history on file.     Allergies  Patient has no known allergies.    ROS: 12 system review was completed and is negative with the exception of those signs and symptoms noted in the history of present illness: A 12 system review was completed and is negative with the exception of those signs and symptoms noted in the history of present illness.     Exam:  General: in NAD, appears stated age  Head: normocephalic, atraumatic  Respiratory: normal effort, no use of accessory muscles  Cardiovascular: no edema noted  Skin: normal turgor, no rashes  Neurologic: grossly intact, oriented to person/place/time  Psychiatric: mode and affect appropriate    Per anesthesiology, clear to auscultation bilaterally with a regular rate and rhythm     Last Recorded Vitals  /84   Pulse 63   Temp 36.3 °C (97.3 °F) (Temporal)   Resp 16   Ht 1.829 m (6')   Wt 101 kg (222 lb 3.6 oz)   SpO2 95%   BMI 30.14 kg/m²       No results found for: \"URINECULTURE\", \"CREATININE\"      ASSESSMENT/PLAN:  No recent changes in his health    Sridhar Villafana MD    " Monitor.  Patient to get repeat ERCP in 2 months to remove stent     Coffee-ground emesis - currently resolved  Esophagitis  Alcohol induced gastritis  Acute blood loss anemia  Notified by RN that patient reporting sudden onset of severe left sided \"lung pain\" (11/16 afternoon)  CTA chest (11/16) with findings of increased heterogeneous gastric contents with prominently hyperattenuating components suspicious for active arterial hemorrhage into the stomach.  Discussed with IR, GI and intensivisit.  CTA abdomen and pelvis (11/16) with heterogeneously hyperattenuating material within the stomach at the fundus, possibly from hemorrhagic contents.   EGD was done (11/17) and showed esophagitis with bleeding. To stop active bleeding, hemostatic spray was deployed. Gastritis was also seen. Pathology pending.  On IV PPI BID.  On Sucralfate suspension 1 gram PO QID.  NPO. PICC line ordered. Nutrition consulted for TPN. PPN while awaiting for PICC placement.  Hemoglobin 17.8 on admission.  Likely due to hemoconcentration.  No baseline available.    Hemoglobin currently down to 8.6.  Iron low 13, ferritin 658, B12 and folate normal  Monitor hemoglobin.    Splenic vein thrombosis  CT abdomen and pelvis showed narrowing of the confluence of the mesenteric veins and main portal vein secondary to extrinsic compression/pancreatitis. (11/13)  CTA abdomen and pelvis (11/16) with splenic vein thrombus.  US liver with dopplers (11/17) also showed splenic vein thrombus and patent remaining portal venous and hepatic venous structures with normal waveforms, as well as patent IVC.  D-dimer elevated 16.45.  Currently patient cannot be anticoagulated due to active GI bleed.   Hematology consulted.  Follow-up with recommendations.    Left-sided flank pain and lower back pain  Dysuria  Possible left pyelonephritis  Left costovertebral angle tenderness on exam (11/16 morning)  CT of abdomen and pelvis with grossly symmetric enhancement of the  bilateral kidneys. No hydronephrosis. No calcified obstructing urinary tract stones.Bladder wall thickening could be secondary to underdistention. Infectious/inflammatory cystitis is not excluded. (11/16)  However, urinalysis unremarkable for UTI.  On antibiotics as above  Currently dysuria has resolved.    Acute respiratory failure with hypoxia  Pulmonary edema  Bilateral pleural effusions  Patient's condition deteriorated and was transferred to critical care unit (11/14)  CT of abdomen and pelvis (11/16) with bilateral pleural effusions with likely complete atelectasis of the bilateral lower lobes with air bronchograms. Dependent atelectasis in the visualized posterior lingula/left upper lobe. Patchy opacities in the visualized right middle lobe.   ABG with pO2 55.  Given IV Lasix. (11/13, 11/14). Re-dose Lasix as needed  D-dimer elevated 16.45.  CTA chest negative for PE.  LE venous dopplers negative for DVT  Currently on 3 L of O2.  Wean off O2 as tolerated.  May need therapeutic thoracentesis. IR thoracentesis ordered.    Chronic alcohol dependence  Alcohol withdrawal with delirium; Acute metabolic encephalopathy  BAL 39 on admissoin  Urine drug screen unremarkable.  Monitored on CIWA protocol.  Thiamine/folic/multivitamins.  S/p Precedex drip (11/13 - 11/14).  Patient currently denies any alcohol withdrawal symptoms.  Chemical dependency/SW consulted.    Hypokalemia  Hypomagnesemia  Given repletion  Resolved     SIRS  Leukocytosis 24.9, tachypnea on admission.  Fever and tachycardia noted as well during the hospitalization.  Lactic acid normal.  TSH normal.  MRCP without pancreatic abscess/fluid collection  Urinalysis unremarkable for infection.  Blood cx negative to date  Leukocytosis had resolved earlier; however now worsening again. WBC up to 20.1 (11/18)  Continued fevers. Monitor    Hyerglycemia without DM  Hemoglobin A1C (%) Date Value 11/11/2024 4.8.   Blood glucose now within normal  limits    Constipation  MiraLAX as needed  Currently resolved    Flat affect  Depressed mood  Patient with flat affect.  Does not make eye contact often.  When asked if he is feeling depressed he replied \"a little\".  Denies suicidal or homicidal ideations.  Declines to speak with behavioral health at this time, stating that he is okay.  Informed patient to let us know if he would like to talk to someone.  He expressed understanding.          Activity  Up to chair 3 times daily  Ambulate 4-5 times daily              PTA home meds:  None                Fluids  Current Facility-Administered Medications   Medication Dose Route Frequency Provider Last Rate Last Admin    dextrose 10 % infusion  1,000 mL Intravenous Continuous PRN Francia Hughes MD              Prophylaxis  Current Active Medications for DVT Prophylaxis (From admission, onward)           Stop     [Held by provider]  enoxaparin (LOVENOX) injection 40 mg  40 mg,   Subcutaneous,   DAILY        (On hold since Thu 11/14/2024 at 1008 until manually unheld; held by Dane Swanson MDHold Reason: Other)   On hold since Thu 11/14/2024 at 1008 until manually unheld   Hold reason: Other    --                      Diet  Dietary Orders (From admission, onward)       Start     Ordered    11/18/24 0847  TPN Diet Without Diet Tray  (Parenteral Nutrition: Dietitian and Pharmacist to [employer])  DIET EFFECTIVE NOW        Question:  TPN Relationship  Answer:  Without Diet Tray    11/18/24 0848                              PCP: Pcp, No     Code Status Information       Code Status    Full Resuscitation               Objective  Blood pressure 114/74, pulse (!) 123, temperature (!) 101.1 °F (38.4 °C), temperature source Oral, resp. rate (!) 24, height 5' 5\" (1.651 m), weight 75.3 kg (166 lb 0.1 oz), SpO2 93%.    Gen: Not in acute distress, overweight  HEENT: anicteric, moist mucous membranes, No conjunctival pallor/injection,  Heme: No lymphadenopathy, no bruises, no  bleeding, no pallor  C/V: No elevated jugular venous pressure, No carotid bruits, S1, S2, No murmurs or rubs or gallops, No lower extremity edema  Lungs: Diminished breath sounds in the bases  Abd: bowel sounds hypoactive, non distended, LUQ nad LLQ tenderness, no rebound or guarding, No organomegaly  : No further CVAT, No suprapubic tenderness  Ext: No joint erythema/swelling/effusion  Skin: No rash, no jaundice  Neuro: Alert oriented x 3, CN grossly intact, strength and sensations normal      Labs     Recent Labs     11/16/24 0521 11/16/24 2117 11/17/24  0422 11/18/24  0354   SODIUM 137 134* 134* 137   POTASSIUM 3.9 4.0 4.2 4.0   CO2 28 25 26 26   ANIONGAP 8 10 7 11   GLUCOSE 88 113* 103* 92   BUN 18 15 15 16   CREATININE 0.71 0.68 0.70 0.82   CALCIUM 8.2* 7.8* 7.9* 7.8*   BILIRUBIN 0.8  --  0.5 0.6   AST 30  --  22 26   GPT 76*  --  51 46   ALKPT 63  --  56 72   GLOB 4.0  --  3.3 3.8   AGR 0.6*  --  0.6* 0.6*        Recent Labs     11/16/24  0521 11/16/24 2117 11/17/24  0113 11/17/24  0422 11/17/24  1218 11/17/24  1635 11/17/24  2234 11/18/24  0354   WBC 9.8   < > 14.8* 15.9*  --   --   --  20.1*   RBC 3.97*   < > 3.24* 3.20*  --   --   --  2.79*   HGB 12.1*   < > 10.0* 9.8*   < > 9.1* 8.8* 8.6*   HCT 36.8*   < > 29.4* 29.4*   < > 27.5* 26.8* 26.1*      < > 263 264  --   --   --  389   MCV 92.7   < > 90.7 91.9  --   --   --  93.5   MCH 30.5   < > 30.9 30.6  --   --   --  30.8   MCHC 32.9   < > 34.0 33.3  --   --   --  33.0   NRBCRE 0   < > 0 0  --   --   --  0   ASEG 8.3*  --  10.4*  --   --   --   --   --    ALYMP 0.9*  --  1.9  --   --   --   --   --    AMONO 0.4  --  2.2*  --   --   --   --   --    AEO 0.1  --   --   --   --   --   --   --    PMOR Normal  --  Normal  --   --   --   --   --     < > = values in this interval not displayed.         Imaging  CT ABDOMEN PELVIS W CONTRAST  Impression:  1. Findings compatible with severe acute pancreatitis with fluid extending into small bowel mesentery,  anterior pararenal space, and paracolic gutters.  2. No evidence of pancreatic necrosis or peripancreatic fluid collection.  3. Mild dilatation of the common duct and intrahepatic biliary tree, which may be reactive.  4. Additional findings are as above.  Electronically Signed by: KATIE CARTER M.D.  Signed on: 11/11/2024 12:31 PM    US LIVER GALLBLADDER PANCREAS (RUQ)  Impression:  1.   Cholelithiasis with no secondary evidence of acute cholecystitis.  2.   Mild dilatation of the CBD (1.1 cm), without any visible shadowing choledocholithiasis.  3.   No intrahepatic ductal dilatation.  4.   Upper abdominal free fluid with findings related to known pancreatitis better seen on preceding CT.  Electronically Signed by: NICOLE CASTRO MD  Signed on: 11/11/2024 5:14 PM    MRI MRCP AND ABDOMEN W WO CONTRAST  Impression:   1. Findings suspicious for acute necrotizing pancreatitis with associated inflammatory changes. No well loculated peripancreatic fluid collection.  2. Moderate volume free fluid which is nonspecific, probably reactive, without drainable fluid collection.  3. Abnormal biliary duct dilation with abrupt cut off of the distal common bile duct suspicious for obstruction and intraluminal filling defects within the CBD suspicious for sludge/stones or other intraluminal pathology. Correlation with ERCP is recommended.  4. Cholelithiasis.  5. Trace right and small left pleural effusions.  6. Additional findings as described above.  Electronically Signed by: LEXA PRITCHETT M.D.   Signed on: 11/12/2024 10:55 AM     CT ABDOMEN PELVIS W CONTRAST  Impression:   1. Partially visualized bilateral pleural effusions new from 11/11/2024. Associated compressive atelectasis and consolidation of the visualized lower lobes with air bronchograms. Superimposed infectious process is  difficult to exclude.  Scarring/atelectasis and partial consolidation of the visualized lingula. Mild scarring/atelectasis of the  visualized  anteromedial right middle lobe.  2.  Interval placement of a plastic biliary stent since the prior  11/11/2020 exam. Stent appears in good position.  3.  Extensive inflammatory changes of the pancreas with multiple areas of irregular pancreatic parenchymal hypoenhancement involving the pancreatic head, uncinate process, neck and body. Findings are concerning for necrotizing pancreatitis. The pancreas appeared edematous and hypoenhancing on 11/11/2024 however the parenchymal enhancement was more homogeneous at  that time. The pancreatic duct is not well visualized or evaluated on the current exam.  4. Increased volume of peripancreatic fluid since the prior CT scan with extensive fluid around the pancreas, extending to the splenic hilum, fluid along the greater curvature of the stomach, perihepatic and perisplenic fluid and fluid tracking inferiorly along the paracolic gutters and iliopsoas muscles into the pelvis.  5. There is no obvious organized rim-enhancing fluid collection to suggest a peripancreatic abscess or walled off pancreatic necrosis.  6. The splenic artery arises independently from the aorta. The splenic artery appears patent although the distal aspect near the splenic hilum is not well visualized, possibly from extrinsic compression/inflammation. The splenic vein is not well visualized, likely related to the pancreatitis  findings. Splenic vein occlusion cannot be excluded.  7. Inferior and superior mesenteric veins appear patent. There appears to be narrowing of the confluence of the mesenteric veins and main portal vein likely secondary to extrinsic compression/pancreatitis related inflammation. Intrahepatic portal veins appear grossly patent.  8.  Mildly dilated fluid-filled distal esophagus. Stomach is distended with fluid. Gastric outlet and proximal duodenum are distended with fluid. The duodenum is likely secondary inflamed from the pancreatitis findings. No obvious evidence of  bowel obstruction. Appendix appears normal. Portions of  the left colon are not well distended, limiting evaluation.  No  pneumoperitoneum.  9.  Likely reactive subcentimeter short axis retroperitoneal and mesenteric lymph nodes.  Electronically Signed by: DANIELLE BENITEZ DO   Signed on: 11/13/2024 5:37 PM    XR CHEST AP OR PA  Impression:   1.  Upper limit of normal size cardiac silhouette with mild perihilar interstitial edema is likely exaggerated by low lung volumes and AP technique.  2.  Small bilateral pleural effusions with adjacent atelectasis or consolidation.  Electronically Signed by: SHAYY VICENTE M.D.   Signed on: 11/13/2024 7:43 PM     XR CHEST AP OR PA  Impression:   1.  Enteric tube projects at the stomach.  2.  Mildly prominent cardiac silhouette with mild perihilar interstitial edema is likely exaggerated by low lung volumes and AP technique.  3.  Small bilateral pleural effusions with adjacent atelectasis or consolidation, left more than right, appears unchanged.  Electronically Signed by: SHAYY VICENTE M.D.   Signed on: 11/13/2024 11:32 PM     XR CHEST AP OR PA  Impression:   1. Findings suggestive of central vascular congestion/edema.  2. Layering pleural effusions with passive atelectasis/consolidation.  3. Subdiaphragmatic enteric tube coiled in the proximal stomach  Electronically Signed by: NICOLE CASTRO MD   Signed on: 11/15/2024 8:26 AM     CT ABDOMEN PELVIS W CONTRAST  Impression:   1. Similar partially visualized bilateral pleural effusions with  significant likely complete atelectasis of the bilateral lower lobes with air bronchograms. Dependent atelectasis in the visualized posterior lingula/left upper lobe. Patchy opacities in the visualized right middle lobe.  2.  Indwelling plastic biliary stent, this appears grossly stable.  3.  Redemonstrated extensive inflammatory changes surrounding the pancreas compatible with pancreatitis. Similar interrupted enhancement of the  pancreatic parenchyma, again concerning for necrotizing pancreatitis. As on the prior exam, the pancreatic duct is not well visualized.  4.  As on the 11/13/2024 exam, extensive inflammatory changes and peripancreatic fluid extend along the adjacent greater curvature of the stomach with slightly more prominent fluid/inflammation along the gastric outlet and proximal duodenum on the current study. Similar volume of likely reactive/inflammatory fluid along the posterior paracolic gutters and retroperitoneum with similar volume of fluid in the pelvis.  5.  Enteric tube is present, coiled in the stomach with tip near the gastric fundus.  Oral contrast is seen in the stomach and proximal duodenum. There is oral contrast seen in the mid/distal small bowel and colon. No obvious evidence of bowel obstruction. Appendix appears grossly  normal.  6.  Grossly symmetric enhancement of the bilateral kidneys. No hydronephrosis. No calcified obstructing urinary tract stones.  7.  Bladder is not fully distended. Bladder wall thickening could be secondary to underdistention. Infectious/inflammatory cystitis is not excluded.  8.  Abdominal aorta and bifurcation are patent. Patent celiac artery and distal branches including the celiac artery. Patent SMA and distal branches. Patent bilateral renal arteries. Patent LAMAR and distal branches.  9.  Intrahepatic portal veins appear patent. There is narrowing of the main portal vein and confluence of the superior and inferior mesenteric veins and splenic veins, likely secondary to pancreatic inflammation. Similar to  prior exam, the splenic vein is not well visualized. Splenic vein occlusion secondary to pancreatitis cannot be excluded.  10.  Likely reactive subcentimeter short axis retroperitoneal and mesenteric lymph nodes.  Electronically Signed by: DANIELLE BENITEZ DO   Signed on: 11/16/2024 4:35 PM    CTA CHEST PULMONARY EMBOLISM  Impression:   1.  No pulmonary embolism to the proximal  subsegmental level.  2.  Small to moderate-sized bilateral layering pleural effusions with probable atelectasis of the lower lobes and portions of the upper lobes and right middle lobe.  3.  Abnormal appearance of the visible upper abdomen consistent with known pancreatitis with acute necrotic collection, but there is also increased heterogeneous gastric contents with prominently hyperattenuating components  which is suspicious for active arterial hemorrhage into the stomach.  4.  Please see above for additional observations.  Electronically Signed by: SHAYY VICENTE M.D.   Signed on: 11/16/2024 8:46 PM     CTA ABDOMEN PELVIS  Impression:   1.  No active arterial extravasation is identified in the stomach.  2.  Heterogeneously hyperattenuating material within the stomach at the fundus has increased since CT abdomen/pelvis performed earlier today and  appears similar compared to recent CTA chest, possibly from hemorrhagic contents.  The hyperattenuating material in the gastric body on prior CTA which appears more dense than previous enteric contrast could be extravasated contrast, but does not appear increased.  3.  Necrotizing pancreatitis with acute necrotic collection and splenic vein thrombus appears very similar compared to the prior CT.  4.  Please see above and prior CT for additional observations.  Electronically Signed by: SHAYY VICENTE M.D.   Signed on: 11/16/2024 11:49 PM     US LIVER AND PORTAL HEPATIC DUPLEX COMPLETE  Impression:   1. Thrombosis of the splenic vein.  2. Patent remaining portal venous and hepatic venous structures with normal waveforms, as above. IVC is patent.  3. Patent hepatic artery with low resistance waveforms.  4. Coarsened hepatic echotexture with small volume ascites.  Electronically Signed by: NICOLE CASTRO MD   Signed on: 11/17/2024 3:29 PM     US VASC LOWER EXTREMITY VENOUS DUPLEX BILATERAL  Impression: No sonographic evidence of deep venous thrombosis in both lower  extremity interrogated veins.  Electronically Signed by: NICOLE CASTRO MD   Signed on: 11/17/2024 7:01 AM               Imaging  US LIVER AND PORTAL HEPATIC DUPLEX COMPLETE  Narrative: LIVER VASCULAR ULTRASOUND WITH DOPPLER IMAGING    HISTORY: Possible extrinsic compression. Rule out thrombosis.    COMPARISON: CT 11/16/2024    TECHNIQUE:  Sonography of the liver with color flow and spectral Doppler  imaging of its vasculature was performed. Images were obtained and stored  in a permanent archive.    RESULT:    Coarsened hepatic echotexture. Small volume ascites.    Patent IVC. Patent right and left hepatic veins.  Main portal vein measures 7.3 mm in diameter with hepatofugal flow and  velocity of 48.6 cm/s  Right portal vein is patent, velocity 12 cm/s  Left portal vein is patent, velocity 17.6 cm/s  SMV is patent on series 6 image 30.  Hepatic artery is patent, with flow resistance waveforms. Peak slight  velocity 117 cm/s.    No flow is seen in the splenic vein at the hilum, consistent with  thrombosis  Impression: 1. Thrombosis of the splenic vein.  2. Patent remaining portal venous and hepatic venous structures with normal  waveforms, as above. IVC is patent.  3. Patent hepatic artery with low resistance waveforms.  4. Coarsened hepatic echotexture with small volume ascites.    Electronically Signed by: NICOLE CASTRO MD   Signed on: 11/17/2024 3:29 PM   Workstation ID: [id number]  US VASC LOWER EXTREMITY VENOUS DUPLEX BILATERAL  Narrative: EXAM: US VASC LOWER EXTREMITY VENOUS DUPLEX BILATERAL    CLINICAL INDICATION: elevated d-dimer, hypoxia. .    COMPARISON: There are no prior examinations currently available for  comparison.    TECHNIQUE: Grayscale, color flow Doppler and spectral waveform analysis of  both lower extremity veins was performed.    FINDINGS:    There is spontaneous and phasic flow to both lower extremity deep veins  with normal proximal and distal augmentation of the Doppler signal  and  compressibility. The calf veins are fairly well seen.  Impression: No sonographic evidence of deep venous thrombosis in both lower extremity  interrogated veins.    Electronically Signed by: NICOLE CASTRO MD   Signed on: 11/17/2024 7:01 AM   Workstation ID: [id number]             Albertina Hughes MD  11/18/2024

## 2025-01-30 LAB
LAB AP ASR DISCLAIMER: NORMAL
LABORATORY COMMENT REPORT: NORMAL
PATH REPORT.FINAL DX SPEC: NORMAL
PATH REPORT.GROSS SPEC: NORMAL
PATH REPORT.RELEVANT HX SPEC: NORMAL
PATH REPORT.TOTAL CANCER: NORMAL

## 2025-02-06 ENCOUNTER — OFFICE VISIT (OUTPATIENT)
Dept: UROLOGY | Facility: CLINIC | Age: 65
End: 2025-02-06
Payer: COMMERCIAL

## 2025-02-06 VITALS — TEMPERATURE: 98 F | DIASTOLIC BLOOD PRESSURE: 92 MMHG | SYSTOLIC BLOOD PRESSURE: 143 MMHG | HEART RATE: 69 BPM

## 2025-02-06 DIAGNOSIS — C61 PROSTATE CANCER (MULTI): ICD-10-CM

## 2025-02-06 DIAGNOSIS — N40.1 BENIGN PROSTATIC HYPERPLASIA WITH URINARY FREQUENCY: ICD-10-CM

## 2025-02-06 DIAGNOSIS — R35.0 BENIGN PROSTATIC HYPERPLASIA WITH URINARY FREQUENCY: ICD-10-CM

## 2025-02-06 PROCEDURE — 3080F DIAST BP >= 90 MM HG: CPT | Performed by: UROLOGY

## 2025-02-06 PROCEDURE — 99214 OFFICE O/P EST MOD 30 MIN: CPT | Performed by: UROLOGY

## 2025-02-06 PROCEDURE — G2211 COMPLEX E/M VISIT ADD ON: HCPCS | Performed by: UROLOGY

## 2025-02-06 PROCEDURE — 3077F SYST BP >= 140 MM HG: CPT | Performed by: UROLOGY

## 2025-02-06 RX ORDER — TAMSULOSIN HYDROCHLORIDE 0.4 MG/1
0.4 CAPSULE ORAL DAILY
Qty: 90 CAPSULE | Refills: 3 | Status: SHIPPED | OUTPATIENT
Start: 2025-02-06 | End: 2026-02-06

## 2025-02-06 ASSESSMENT — ENCOUNTER SYMPTOMS
DEPRESSION: 0
LOSS OF SENSATION IN FEET: 0
OCCASIONAL FEELINGS OF UNSTEADINESS: 0

## 2025-02-06 NOTE — PROGRESS NOTES
CHIEF COMPLAINT:  Presents to the office today to followup prostate cancer on active surveillance diagnosed 06/2022 for Grade Group 1 prostate adenocarcinoma ([id number]).    PROSTATE CANCER HISTORY:  Initial PSA 6.48 in 06/2022 with clinically nonpalpable disease. MRI prostate 06/21/2022 showed 39g gland, PSA density 0.17, PI-RADS 2 target, no concerning nodes. Template biopsy on 08/09/2022 showed single core Grade Group 1 disease, less than 5%. NCCN low risk (not very low risk due to PSA density). PSA increased to 7.66 in 03/2023, then lost to follow-up. PSA 10.16 on 11/08/24. Re-established care 12/05/24.    KEY INFORMATION:  - Diagnosis: 06/2022, Grade Group 1, single core <5%, PSA 6.48  - Biopsy History:   08/09/2022: 39g gland, Grade Group 1, single core <5%   01/14/2025: 5/17 cores positive, Grade Group 1, right posterior lateral, right anterior, left anterior  - MRI History:   06/21/2022: 39g gland, PI-RADS 2, no nodes   12/30/2024: 39g gland, PI-RADS 4 lesion left posterolateral peripheral zone, no EPE or nodes  - Genomics history: not known  - PSAD: 0.26 (current)  - Tentative plan: PSA monitoring every 6 months    HPI TODAY 02/06/2025:  - Post-biopsy recovery unremarkable  - Reports urinary frequency, urgency, weak stream  - Unable to hold urine, chronic urinary leakage predating CVA  - PSAD: 0.26    PMH, PSH, MEDS, ALLERGIES, SH, and FH:  - PMH: CVA with residual expressive aphasia  - SH: History of EtOH (not current), works around house/barn 3 days/week    PHYSICAL EXAMINATION:  Constitutional: NAD, alert and oriented  : Mild induration right hemigland, not concerning for palpable cancer    ASSESSMENT AND PLAN:    1. Prostate Cancer (C61)  - Assessment: Low-risk prostate cancer on active surveillance, borderline due to PSA ~10  - Plan: Continue active surveillance with PSA q6months  - Counseling: Discussed PSA monitoring as key  for treatment decisions    2. Lower Urinary Tract Symptoms  (O24.29)  - Assessment: Mixed urinary symptoms with urgency, frequency, weak stream  - Plan: Start tamsulosin nightly. Consider trospium if symptoms persist  - Counseling: Discussed medication side effects including orthostatic hypotension    ORDERS:  - Tamsulosin 0.4mg daily at bedtime  - PSA in 6 months    FOLLOW UP:  Return visit in 6 months with PSA 1 week prior    FUTURE CONSIDERATIONS:  Consider transition from active surveillance if PSA velocity concerning or symptoms progress    SHORT SUMMARY:  Post-biopsy follow-up shows continued Grade Group 1 disease appropriate for active surveillance. Started tamsulosin for bothersome LUTS with plan for close PSA monitoring.

## 2025-04-21 DIAGNOSIS — I10 PRIMARY HYPERTENSION: ICD-10-CM

## 2025-04-21 RX ORDER — CARVEDILOL 3.12 MG/1
3.12 TABLET ORAL
Qty: 180 TABLET | Refills: 3 | Status: SHIPPED | OUTPATIENT
Start: 2025-04-21 | End: 2026-04-21

## 2025-04-30 LAB
ALBUMIN SERPL-MCNC: 4.5 G/DL (ref 3.6–5.1)
ALP SERPL-CCNC: 49 U/L (ref 35–144)
ALT SERPL-CCNC: 17 U/L (ref 9–46)
ANION GAP SERPL CALCULATED.4IONS-SCNC: 10 MMOL/L (CALC) (ref 7–17)
AST SERPL-CCNC: 17 U/L (ref 10–35)
BASOPHILS # BLD AUTO: 29 CELLS/UL (ref 0–200)
BASOPHILS NFR BLD AUTO: 0.6 %
BILIRUB SERPL-MCNC: 0.6 MG/DL (ref 0.2–1.2)
BUN SERPL-MCNC: 15 MG/DL (ref 7–25)
CALCIUM SERPL-MCNC: 9.4 MG/DL (ref 8.6–10.3)
CHLORIDE SERPL-SCNC: 101 MMOL/L (ref 98–110)
CHOLEST SERPL-MCNC: 189 MG/DL
CHOLEST/HDLC SERPL: 2.9 (CALC)
CO2 SERPL-SCNC: 27 MMOL/L (ref 20–32)
CREAT SERPL-MCNC: 0.7 MG/DL (ref 0.7–1.35)
EGFRCR SERPLBLD CKD-EPI 2021: 104 ML/MIN/1.73M2
EOSINOPHIL # BLD AUTO: 322 CELLS/UL (ref 15–500)
EOSINOPHIL NFR BLD AUTO: 6.7 %
ERYTHROCYTE [DISTWIDTH] IN BLOOD BY AUTOMATED COUNT: 12.9 % (ref 11–15)
GLUCOSE SERPL-MCNC: 92 MG/DL (ref 65–99)
HCT VFR BLD AUTO: 45 % (ref 38.5–50)
HDLC SERPL-MCNC: 66 MG/DL
HGB BLD-MCNC: 15 G/DL (ref 13.2–17.1)
LDLC SERPL CALC-MCNC: 106 MG/DL (CALC)
LYMPHOCYTES # BLD AUTO: 1733 CELLS/UL (ref 850–3900)
LYMPHOCYTES NFR BLD AUTO: 36.1 %
MAGNESIUM SERPL-MCNC: 1.9 MG/DL (ref 1.5–2.5)
MCH RBC QN AUTO: 28.7 PG (ref 27–33)
MCHC RBC AUTO-ENTMCNC: 33.3 G/DL (ref 32–36)
MCV RBC AUTO: 86.2 FL (ref 80–100)
MONOCYTES # BLD AUTO: 456 CELLS/UL (ref 200–950)
MONOCYTES NFR BLD AUTO: 9.5 %
NEUTROPHILS # BLD AUTO: 2261 CELLS/UL (ref 1500–7800)
NEUTROPHILS NFR BLD AUTO: 47.1 %
NONHDLC SERPL-MCNC: 123 MG/DL (CALC)
PLATELET # BLD AUTO: 197 THOUSAND/UL (ref 140–400)
PMV BLD REES-ECKER: 11.3 FL (ref 7.5–12.5)
POTASSIUM SERPL-SCNC: 5 MMOL/L (ref 3.5–5.3)
PROT SERPL-MCNC: 6.8 G/DL (ref 6.1–8.1)
PSA FREE MFR SERPL: ABNORMAL % (CALC)
PSA FREE SERPL-MCNC: 0.9 NG/ML
PSA SERPL-MCNC: 11.3 NG/ML
RBC # BLD AUTO: 5.22 MILLION/UL (ref 4.2–5.8)
SODIUM SERPL-SCNC: 138 MMOL/L (ref 135–146)
TRIGL SERPL-MCNC: 82 MG/DL
TSH SERPL-ACNC: 2.29 MIU/L (ref 0.4–4.5)
WBC # BLD AUTO: 4.8 THOUSAND/UL (ref 3.8–10.8)

## 2025-05-06 DIAGNOSIS — F17.200 TOBACCO USE DISORDER: ICD-10-CM

## 2025-05-06 RX ORDER — VARENICLINE TARTRATE 1 MG/1
1 TABLET, FILM COATED ORAL 2 TIMES DAILY
Qty: 60 TABLET | Refills: 5 | Status: SHIPPED | OUTPATIENT
Start: 2025-05-06 | End: 2025-11-02

## 2025-05-20 ENCOUNTER — APPOINTMENT (OUTPATIENT)
Dept: PRIMARY CARE | Facility: CLINIC | Age: 65
End: 2025-05-20
Payer: COMMERCIAL

## 2025-05-20 VITALS
RESPIRATION RATE: 16 BRPM | WEIGHT: 231.5 LBS | OXYGEN SATURATION: 92 % | HEIGHT: 72 IN | HEART RATE: 68 BPM | BODY MASS INDEX: 31.36 KG/M2 | DIASTOLIC BLOOD PRESSURE: 88 MMHG | TEMPERATURE: 97.4 F | SYSTOLIC BLOOD PRESSURE: 144 MMHG

## 2025-05-20 DIAGNOSIS — R35.0 BENIGN PROSTATIC HYPERPLASIA WITH URINARY FREQUENCY: ICD-10-CM

## 2025-05-20 DIAGNOSIS — Z00.00 ROUTINE GENERAL MEDICAL EXAMINATION AT HEALTH CARE FACILITY: Primary | ICD-10-CM

## 2025-05-20 DIAGNOSIS — N40.1 BENIGN PROSTATIC HYPERPLASIA WITH URINARY FREQUENCY: ICD-10-CM

## 2025-05-20 DIAGNOSIS — I10 PRIMARY HYPERTENSION: ICD-10-CM

## 2025-05-20 DIAGNOSIS — E66.811 CLASS 1 OBESITY DUE TO EXCESS CALORIES WITH SERIOUS COMORBIDITY AND BODY MASS INDEX (BMI) OF 31.0 TO 31.9 IN ADULT: ICD-10-CM

## 2025-05-20 DIAGNOSIS — Z71.89 ADVANCED DIRECTIVES, COUNSELING/DISCUSSION: ICD-10-CM

## 2025-05-20 DIAGNOSIS — E66.09 CLASS 1 OBESITY DUE TO EXCESS CALORIES WITH SERIOUS COMORBIDITY AND BODY MASS INDEX (BMI) OF 31.0 TO 31.9 IN ADULT: ICD-10-CM

## 2025-05-20 DIAGNOSIS — Z87.891 FORMER CIGARETTE SMOKER: ICD-10-CM

## 2025-05-20 DIAGNOSIS — G89.29 CHRONIC LEFT-SIDED LOW BACK PAIN WITHOUT SCIATICA: ICD-10-CM

## 2025-05-20 DIAGNOSIS — C61 MALIGNANT NEOPLASM OF PROSTATE (MULTI): ICD-10-CM

## 2025-05-20 DIAGNOSIS — M54.50 CHRONIC LEFT-SIDED LOW BACK PAIN WITHOUT SCIATICA: ICD-10-CM

## 2025-05-20 DIAGNOSIS — J44.9 CHRONIC OBSTRUCTIVE PULMONARY DISEASE, UNSPECIFIED COPD TYPE (MULTI): ICD-10-CM

## 2025-05-20 DIAGNOSIS — Z23 NEED FOR VACCINATION: ICD-10-CM

## 2025-05-20 PROBLEM — I69.30 HISTORY OF CEREBROVASCULAR ACCIDENT (CVA) WITH RESIDUAL DEFICIT: Status: ACTIVE | Noted: 2025-01-14

## 2025-05-20 PROBLEM — F17.210 CIGARETTE SMOKER: Status: ACTIVE | Noted: 2023-05-01

## 2025-05-20 RX ORDER — TAMSULOSIN HYDROCHLORIDE 0.4 MG/1
0.4 CAPSULE ORAL DAILY
Qty: 90 CAPSULE | Refills: 3 | Status: SHIPPED | OUTPATIENT
Start: 2025-05-20 | End: 2026-05-20

## 2025-05-20 RX ORDER — CARVEDILOL 3.12 MG/1
3.12 TABLET ORAL
Qty: 180 TABLET | Refills: 3 | Status: SHIPPED | OUTPATIENT
Start: 2025-05-20 | End: 2026-05-20

## 2025-05-20 RX ORDER — VARENICLINE TARTRATE 1 MG/1
1 TABLET, FILM COATED ORAL 2 TIMES DAILY
Qty: 60 TABLET | Refills: 5 | Status: SHIPPED | OUTPATIENT
Start: 2025-05-20 | End: 2025-11-16

## 2025-05-20 ASSESSMENT — ENCOUNTER SYMPTOMS
WHEEZING: 0
WOUND: 0
CHEST TIGHTNESS: 0
EYE DISCHARGE: 0
SPEECH DIFFICULTY: 1
LOSS OF SENSATION IN FEET: 0
HEMATURIA: 0
DYSURIA: 0
NECK STIFFNESS: 0
OCCASIONAL FEELINGS OF UNSTEADINESS: 0
CONSTIPATION: 0
BLOOD IN STOOL: 0
CHOKING: 0
HALLUCINATIONS: 0
SLEEP DISTURBANCE: 0
EYE ITCHING: 0
ACTIVITY CHANGE: 0
FLANK PAIN: 0
PALPITATIONS: 0
APPETITE CHANGE: 0
ADENOPATHY: 0
RHINORRHEA: 0
FREQUENCY: 0
TROUBLE SWALLOWING: 0
PHOTOPHOBIA: 0
POLYDIPSIA: 0
DYSPHORIC MOOD: 0
BRUISES/BLEEDS EASILY: 0
DIARRHEA: 0
HYPERTENSION: 1
DEPRESSION: 0
ABDOMINAL DISTENTION: 0
EYE PAIN: 0
BACK PAIN: 0
SHORTNESS OF BREATH: 0
AGITATION: 0
VOICE CHANGE: 0
SINUS PRESSURE: 0
EYE REDNESS: 0
DIZZINESS: 1
UNEXPECTED WEIGHT CHANGE: 0

## 2025-05-20 ASSESSMENT — PATIENT HEALTH QUESTIONNAIRE - PHQ9
SUM OF ALL RESPONSES TO PHQ9 QUESTIONS 1 AND 2: 0
2. FEELING DOWN, DEPRESSED OR HOPELESS: NOT AT ALL
1. LITTLE INTEREST OR PLEASURE IN DOING THINGS: NOT AT ALL

## 2025-05-20 ASSESSMENT — ACTIVITIES OF DAILY LIVING (ADL)
DRESSING: INDEPENDENT
BATHING: INDEPENDENT
TAKING_MEDICATION: INDEPENDENT
MANAGING_FINANCES: INDEPENDENT
GROCERY_SHOPPING: INDEPENDENT
DOING_HOUSEWORK: INDEPENDENT

## 2025-06-03 ENCOUNTER — HOSPITAL ENCOUNTER (OUTPATIENT)
Dept: RADIOLOGY | Facility: CLINIC | Age: 65
Discharge: HOME | End: 2025-06-03
Payer: COMMERCIAL

## 2025-06-03 DIAGNOSIS — Z87.891 FORMER CIGARETTE SMOKER: ICD-10-CM

## 2025-06-03 PROCEDURE — 71271 CT THORAX LUNG CANCER SCR C-: CPT | Performed by: RADIOLOGY

## 2025-06-03 PROCEDURE — 71271 CT THORAX LUNG CANCER SCR C-: CPT

## 2025-06-09 DIAGNOSIS — R93.1 ELEVATED CORONARY ARTERY CALCIUM SCORE: ICD-10-CM

## 2025-07-08 ENCOUNTER — APPOINTMENT (OUTPATIENT)
Dept: CARDIOLOGY | Facility: CLINIC | Age: 65
End: 2025-07-08
Payer: COMMERCIAL

## 2025-07-08 VITALS
BODY MASS INDEX: 31.8 KG/M2 | HEIGHT: 72 IN | WEIGHT: 234.8 LBS | DIASTOLIC BLOOD PRESSURE: 82 MMHG | SYSTOLIC BLOOD PRESSURE: 130 MMHG | HEART RATE: 64 BPM

## 2025-07-08 DIAGNOSIS — R06.02 SHORTNESS OF BREATH: ICD-10-CM

## 2025-07-08 DIAGNOSIS — R93.1 ELEVATED CORONARY ARTERY CALCIUM SCORE: ICD-10-CM

## 2025-07-08 DIAGNOSIS — I25.10 CORONARY ARTERY DISEASE INVOLVING NATIVE CORONARY ARTERY OF NATIVE HEART WITHOUT ANGINA PECTORIS: ICD-10-CM

## 2025-07-08 PROCEDURE — 3008F BODY MASS INDEX DOCD: CPT | Performed by: INTERNAL MEDICINE

## 2025-07-08 PROCEDURE — 93000 ELECTROCARDIOGRAM COMPLETE: CPT | Performed by: INTERNAL MEDICINE

## 2025-07-08 PROCEDURE — 3079F DIAST BP 80-89 MM HG: CPT | Performed by: INTERNAL MEDICINE

## 2025-07-08 PROCEDURE — 3075F SYST BP GE 130 - 139MM HG: CPT | Performed by: INTERNAL MEDICINE

## 2025-07-08 PROCEDURE — 99204 OFFICE O/P NEW MOD 45 MIN: CPT | Performed by: INTERNAL MEDICINE

## 2025-07-08 NOTE — PROGRESS NOTES
Patient:  Home Salas  YOB: 1960  MRN: [id number]       Impression/Plan:     Diagnoses and all orders for this visit:  Elevated coronary artery calcium score  Shortness of breath  -    Would obtain a formal calcium score  -    Coronary angiography just 6 years ago was normal recent cholesterol LDL of 106 but HDL is 66 we will pursue exercise stress test as well to better risk assess I suspect some of his shortness of breath is more related to obesity and deconditioning but it is actually fairly mild he can accomplish a high level of activity  -      Cardiogram to be obtained to assure no abnormalities of LV or RV systolic function as a contributing factor to his dyspnea  -     Transthoracic Echo Complete; Future  -     Stress Test; Future  -     CT cardiac scoring wo IV contrast; Future    Coronary artery disease involving native coronary artery of native heart without angina pectoris  -     Coronary atherosclerosis described on the CT but not a formal coronary calcium score will obtain a formal score.  It is unlikely he has developed significant progression of disease in the 6 years since he had a normal coronary angiogram.      Chief Complaint/Active Symptoms:      Chief Complaint   Patient presents with    New Patient Visit     New patient - Referred by Dr. Marie for an elevated calcium score.        Home Salas is a 65 y.o. male who presents with hypertension, COPD, remote pulmonary embolism, obesity low-grade prostate carcinoma.      Consultation secondary to calcium noted on a CT scan performed for screening.  He has no exertional chest pain.  But he does get short of breath on occasion.  Usually if he does more than what he is used to.  He had no prior history myocardial infarction, stroke or symptoms of peripheral vascular disease.  He referred to an episode in 2019 as a stroke but actually he had had a syncopal episode and encephalopathy felt related to overheating and alcohol.  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  He works outside in the yard with a pick ax without chest tightness usually without shortness of breath.      2019 coronary angiography: Normal study mild myocardial bridging normal LV function    2025 screening CT chest  1. No worrisome pulmonary nodule. Advise ongoing annual low-dose lung  cancer screening CT  2. Estimated coronary artery calcium score is 100.0* which correlates  with at least 60th percentile rank as compared to matched SANTOS-study  subjects(https://www.santos-nhlbi.org/Calcium/input.aspx). Additional  investigation may be pursued as clinically appropriate    25 laboratory studies TSH/CBC/CMP/magnesium normal  Cholesterol 189 HDL 66 triglycerides 82     EC2025 normal sinus rhythm borderline criteria for left atrial enlargement otherwise unremarkable    Medical History[1]  2019 found unresponsive required ventilatory support apparently had had a syncopal episode when mowing the lawn.  GI bleeding evident during that hospital stay.  Found to have fatty liver changes, peripheral neuropathy, acute kidney injury.  12 had acute hepatitis possibly alcohol related as well as initial thrombocytopenia which resolved.  Temperature was as high as 106 was felt he had had syncope secondary to effects of alcohol and heat outside.  Neurology did not feel a stroke had occurred.    Prior significant alcohol use discontinued   Remote tobacco abuse  Hypertension  Remote pulmonary embolism, details unknown  COPD  BPH  Adenocarcinoma the prostate low risk profile as of  followed by urology    Past surgical history  None    Family history  Father with COPD and coronary disease  Mother with renal disease             Social history  Prior alcohol and tobacco abuse    Review of Systems: Unremarkable except as noted above    Meds     Current Outpatient  Medications   Medication Instructions    carvedilol (COREG) 3.125 mg, oral, 2 times daily (morning and late afternoon)    tamsulosin (FLOMAX) 0.4 mg, oral, Daily    varenicline tartrate (CHANTIX) 1 mg, oral, 2 times daily, Take with full glass of water.        Allergies   Allergies[2]      Annotated Problems     Specialty Problems          Cardiology Problems    Former cigarette smoker    Hypertension    DVT (deep venous thrombosis) (Multi)    Dyspnea on exertion        Problem List     Patient Active Problem List    Diagnosis Date Noted    Dyspnea on exertion 01/14/2025    GI bleed 01/14/2025    History of cerebrovascular accident (CVA) with residual deficit 01/14/2025    History of blood transfusion 01/14/2025    Class 1 obesity due to excess calories with serious comorbidity and body mass index (BMI) of 31.0 to 31.9 in adult 01/14/2025    Alcoholic liver disease 01/14/2025    DVT (deep venous thrombosis) (Multi) 01/14/2025    Transfusion of blood product refused for Confucianism reason 01/14/2025    Chronic low back pain 01/13/2025    Ear pain, left 04/03/2024    Benign prostatic hyperplasia with urinary frequency 05/01/2023    Chronic obstructive pulmonary disease (Multi) 05/01/2023    Expressive aphasia 05/01/2023    Hypertension 05/01/2023    Prostate cancer (Multi) 05/01/2023    Sleep apnea 05/01/2023    Former cigarette smoker 05/01/2023    Unsteady gait 05/01/2023       Objective:     Vitals:    07/08/25 0826   BP: 130/82   BP Location: Left arm   Patient Position: Sitting   Pulse: 64   Weight: 107 kg (234 lb 12.8 oz)   Height: 1.829 m (6')      Wt Readings from Last 4 Encounters:   07/08/25 107 kg (234 lb 12.8 oz)   05/20/25 105 kg (231 lb 8 oz)   01/14/25 101 kg (222 lb 3.6 oz)   11/12/24 102 kg (225 lb 8 oz)           LAB:     Lab Results   Component Value Date    WBC 4.8 04/29/2025    HGB 15.0 04/29/2025    HCT 45.0 04/29/2025     04/29/2025    CHOL 189 04/29/2025    TRIG 82 04/29/2025    HDL 66  04/29/2025    ALT 17 04/29/2025    AST 17 04/29/2025     04/29/2025    K 5.0 04/29/2025     04/29/2025    CREATININE 0.70 04/29/2025    BUN 15 04/29/2025    CO2 27 04/29/2025    TSH 2.29 04/29/2025    PSA 11.3 (H) 04/29/2025    INR 1.0 09/06/2019         Physical Exam     General Appearance: alert and oriented to person, place and time, in no acute distress  Cardiovascular: normal rate, regular rhythm, normal S1 and S2, no murmurs, rubs, clicks, or gallops,  no JVD  Pulmonary/Chest: clear to auscultation bilaterally- no wheezes, rales or rhonchi, normal air movement, no respiratory distress  Abdomen: soft, non-tender, non-distended, normal bowel sounds, no masses   Extremities: no cyanosis, clubbing or edema  Skin: warm and dry, no rash or erythema  Eyes: EOMI  Neck: supple and non-tender without mass, no thyromegaly   Neurological: alert, oriented, normal speech, no focal findings or movement disorder noted  Vascular:       Provider attestation-scribe documentation  Any medical record entries made by the scribe were at my discretion and personally dictated by me.  I have reviewed the chart and agree that the record accurately reflects my personal performance of the history, physical exam, discussion and plan.    Scribe Attestation  By signing my name below, I, Viviana Mo MA, Scribe   attest that this documentation has been prepared under the direction and in the presence of Mikey Antonio MD.              [1]   Past Medical History:  Diagnosis Date    Cerebral vascular accident (Multi)     COPD (chronic obstructive pulmonary disease) (Multi)     Encounter for immunization     Encounter for immunization    Encounter for screening for malignant neoplasm of colon 10/10/2019    Colon cancer screening    Encounter for screening for malignant neoplasm of prostate 10/06/2021    Screening for prostate cancer    Encounter for screening for malignant neoplasm of prostate 09/05/2019    Special screening  examination for neoplasm of prostate    Hypertension     Immunization not carried out because of patient refusal 10/06/2021    Refused influenza vaccine    Other abnormalities of gait and mobility 09/27/2019    Decreased mobility    Other cardiomyopathies 09/05/2019    Cardiomyopathy, nonischemic    Personal history of other diseases of the musculoskeletal system and connective tissue 08/07/2020    History of low back pain    Personal history of other specified conditions 08/18/2020    History of vertigo    Personal history of other specified conditions 09/18/2019    History of dizziness    Problem related to lifestyle, unspecified 09/05/2019    Lifestyle problems    Prostate cancer (Multi)     Unspecified chronic gastritis with bleeding 09/05/2019    Gastrointestinal hemorrhage associated with chronic gastritis   [2] No Known Allergies

## 2025-07-08 NOTE — PATIENT INSTRUCTIONS
AVOID PROCESSED FOODS, THEY USUALLY ARE HIGH IN SODIUM.  SODIUM IS BAD FOR BLOOD PRESSURE.  EAT FRESH OR FROZEN VEGETABLES AND FRESH MEATS.  AVOID SUASUAGES, HOT DOGS AND LUNCH MEATS.    DECREASE CARBOHYDRATES, RICE, PASTA AND POTATOES.  THIS WILL HELP WITH WEIGHT LOSS AND BLOOD SUGAR.     HOLD CARVEDILOL 2 DAYS PRIOR TO STRESS TEST

## 2025-08-01 DIAGNOSIS — C61 PROSTATE CANCER (MULTI): ICD-10-CM

## 2025-08-07 ENCOUNTER — OFFICE VISIT (OUTPATIENT)
Dept: UROLOGY | Facility: CLINIC | Age: 65
End: 2025-08-07
Payer: COMMERCIAL

## 2025-08-07 ENCOUNTER — LAB (OUTPATIENT)
Dept: LAB | Facility: CLINIC | Age: 65
End: 2025-08-07
Payer: COMMERCIAL

## 2025-08-07 VITALS — SYSTOLIC BLOOD PRESSURE: 150 MMHG | TEMPERATURE: 97.5 F | DIASTOLIC BLOOD PRESSURE: 82 MMHG | HEART RATE: 65 BPM

## 2025-08-07 DIAGNOSIS — C61 PROSTATE CANCER (MULTI): ICD-10-CM

## 2025-08-07 PROCEDURE — 99215 OFFICE O/P EST HI 40 MIN: CPT | Performed by: UROLOGY

## 2025-08-07 PROCEDURE — 3079F DIAST BP 80-89 MM HG: CPT | Performed by: UROLOGY

## 2025-08-07 PROCEDURE — 3077F SYST BP >= 140 MM HG: CPT | Performed by: UROLOGY

## 2025-08-07 PROCEDURE — 36415 COLL VENOUS BLD VENIPUNCTURE: CPT | Performed by: UROLOGY

## 2025-08-07 PROCEDURE — G2211 COMPLEX E/M VISIT ADD ON: HCPCS | Performed by: UROLOGY

## 2025-08-07 PROCEDURE — 84153 ASSAY OF PSA TOTAL: CPT | Performed by: UROLOGY

## 2025-08-08 LAB — PSA SERPL-MCNC: 8.56 NG/ML

## 2025-08-10 NOTE — PROGRESS NOTES
PAST UROLOGICAL HISTORY:  65-year-old man with Grade Group 1 prostate adenocarcinoma diagnosed 06/2022. Initial PSA 6.48 with nonpalpable disease. MRI showed 39g gland, PSA density 0.17, PI-RADS 2 target. Template biopsy 08/09/2022 revealed single core Grade Group 1 disease <5%. NCCN low risk. PSA increased to 7.66 in 03/2023, then lost to follow-up. Re-established care 12/05/2024 with PSA 10.16. Re-biopsy 01/14/2025 showed 5/17 cores positive, Grade Group 1. Recent MRI 12/30/2024 demonstrated PI-RADS 4 lesion left posterolateral peripheral zone, no EPE or nodes.    Notes, images, and tests were independently reviewed and interpreted as noted herein.    HPI TODAY 08/07/2025:    Prostate Cancer on Active Surveillance:  - PSA continues climbing from 7.66 (03/2023) to 10.16 (11/2024) to 11.3 (04/2025)  - Mismatch between low-grade cancer and elevated PSA concerning  - Post-biopsy recovery unremarkable  - Reports urinary frequency, urgency, weak stream  - Unable to hold urine with chronic urinary leakage predating CVA  - Currently favorable intermediate risk due to PSA >10    PSA History:  - 06/2022: 6.48  - 03/2023: 7.66  - 11/2024: 10.16  - 04/2025: 11.3  - PSA Density: 0.26    PAST MEDICAL HISTORY:  - CVA with chronic urinary leakage predating current symptoms  Expressive  aphasia from prior stroke    REVIEW OF SYSTEMS: A tailored review of systems was performed and all pertinent positives and negatives are listed in the HPI.    PHYSICAL EXAMINATION:  Gen: NAD  Pulm: No increased WOB on RA  Cards: WWP    ASSESSMENT/PLAN:    Prostate Adenocarcinoma Grade Group 1 on Active Surveillance (C78.00)  - Assessment: Rising PSA concerning for progression despite low-grade histology  - Plan: PSA recheck today  - Plan: Decipher score on January 2025 biopsy tissue  - Plan: Continue active surveillance pending results  - Plan: PSA monitoring q6 months  - Plan: Follow-up in 1 year unless results indicate need for treatment  discussion  - Counseling: Risks, benefits, and alternatives were discussed including but not limited to potential need for treatment if tests show red flags. Surgery and radiation options reviewed with associated risks including incontinence, ED, urinary symptoms. No rush for treatment given slow-growing nature of disease. [email or web address] recommended for patient education materials.

## 2025-08-19 ENCOUNTER — ANCILLARY PROCEDURE (OUTPATIENT)
Dept: CARDIOLOGY | Facility: HOSPITAL | Age: 65
End: 2025-08-19
Payer: COMMERCIAL

## 2025-08-19 DIAGNOSIS — I25.10 CORONARY ARTERY DISEASE INVOLVING NATIVE CORONARY ARTERY OF NATIVE HEART WITHOUT ANGINA PECTORIS: ICD-10-CM

## 2025-08-19 DIAGNOSIS — R93.1 ELEVATED CORONARY ARTERY CALCIUM SCORE: ICD-10-CM

## 2025-08-19 DIAGNOSIS — R06.02 SHORTNESS OF BREATH: ICD-10-CM

## 2025-08-19 PROCEDURE — 93017 CV STRESS TEST TRACING ONLY: CPT

## 2025-08-19 PROCEDURE — 93016 CV STRESS TEST SUPVJ ONLY: CPT | Performed by: INTERNAL MEDICINE

## 2025-08-19 PROCEDURE — 2500000004 HC RX 250 GENERAL PHARMACY W/ HCPCS (ALT 636 FOR OP/ED): Mod: JW | Performed by: INTERNAL MEDICINE

## 2025-08-19 PROCEDURE — 93018 CV STRESS TEST I&R ONLY: CPT | Performed by: INTERNAL MEDICINE

## 2025-08-19 PROCEDURE — C8929 TTE W OR WO FOL WCON,DOPPLER: HCPCS

## 2025-08-19 PROCEDURE — 93306 TTE W/DOPPLER COMPLETE: CPT | Performed by: INTERNAL MEDICINE

## 2025-08-19 RX ADMIN — HUMAN ALBUMIN MICROSPHERES AND PERFLUTREN 0.5 ML: 10; .22 INJECTION, SOLUTION INTRAVENOUS at 11:17

## 2025-08-20 LAB
AORTIC VALVE MEAN GRADIENT: 4 MMHG
AORTIC VALVE PEAK VELOCITY: 1.33 M/S
AV PEAK GRADIENT: 7 MMHG
AVA (PEAK VEL): 4.68 CM2
AVA (VTI): 4.55 CM2
EJECTION FRACTION APICAL 4 CHAMBER: 65.6
EJECTION FRACTION: 65 %
LEFT VENTRICLE INTERNAL DIMENSION DIASTOLE: 3.48 CM (ref 3.5–6)
LEFT VENTRICULAR OUTFLOW TRACT DIAMETER: 2.38 CM
LV EJECTION FRACTION BIPLANE: 65 %
MITRAL VALVE E/A RATIO: 1.15
MITRAL VALVE E/E' RATIO: 5.95
RIGHT VENTRICLE FREE WALL PEAK S': 13.76 CM/S
RIGHT VENTRICLE PEAK SYSTOLIC PRESSURE: 44 MMHG
TRICUSPID ANNULAR PLANE SYSTOLIC EXCURSION: 2.5 CM

## 2025-08-26 ENCOUNTER — HOSPITAL ENCOUNTER (OUTPATIENT)
Dept: RADIOLOGY | Facility: CLINIC | Age: 65
Discharge: HOME | End: 2025-08-26
Payer: COMMERCIAL

## 2025-08-26 DIAGNOSIS — R06.02 SHORTNESS OF BREATH: ICD-10-CM

## 2025-08-26 DIAGNOSIS — R93.1 ELEVATED CORONARY ARTERY CALCIUM SCORE: ICD-10-CM

## 2025-08-26 DIAGNOSIS — I25.10 CORONARY ARTERY DISEASE INVOLVING NATIVE CORONARY ARTERY OF NATIVE HEART WITHOUT ANGINA PECTORIS: ICD-10-CM

## 2025-08-26 PROCEDURE — 75571 CT HRT W/O DYE W/CA TEST: CPT

## 2025-09-02 ENCOUNTER — APPOINTMENT (OUTPATIENT)
Dept: CARDIOLOGY | Facility: CLINIC | Age: 65
End: 2025-09-02
Payer: COMMERCIAL

## 2025-09-02 PROBLEM — R93.1 ELEVATED CORONARY ARTERY CALCIUM SCORE: Status: ACTIVE | Noted: 2025-09-02

## 2025-09-02 PROBLEM — I27.20 PULMONARY HYPERTENSION, UNSPECIFIED (MULTI): Status: ACTIVE | Noted: 2025-09-02

## 2025-12-02 ENCOUNTER — APPOINTMENT (OUTPATIENT)
Dept: CARDIOLOGY | Facility: CLINIC | Age: 65
End: 2025-12-02
Payer: COMMERCIAL

## 2026-05-28 ENCOUNTER — APPOINTMENT (OUTPATIENT)
Dept: PRIMARY CARE | Facility: CLINIC | Age: 66
End: 2026-05-28
Payer: COMMERCIAL

## (undated) DEVICE — GOWN, ASTOUND, L

## (undated) DEVICE — SYRINGE, 35 CC, LUER LOCK, MONOJECT, LF

## (undated) DEVICE — DRESSING, NON-ADHERENT, TELFA, OUCHLESS, 3 X 8 IN, STERILE

## (undated) DEVICE — PAD, SANITARY, MAXI, U BY KOTEX, REG, LF

## (undated) DEVICE — GOWN, ASTOUND, XXL

## (undated) DEVICE — PANTY, MESH, LARGE

## (undated) DEVICE — LABELS, OR GENERAL, W/MARKER

## (undated) DEVICE — DRAPE, LEGGINGS, 28.5 X 43 IN, DISPOSABLE, LF, STERILE

## (undated) DEVICE — COVER, TABLE, 44X90

## (undated) DEVICE — APPLICATOR, PREP, CHLORAPREP, W/ORANGE TINT, 10.5ML

## (undated) DEVICE — GLOVE, SURGICAL, PROTEXIS PI , 8.0, PF, LF

## (undated) DEVICE — SPONGE, GAUZE, RADIOPAQUE, 12 PLY, 4 X 8 IN, STERILE, LF

## (undated) DEVICE — NEEDLE, BIOPSY, MAX CORE, 18G

## (undated) DEVICE — NEEDLE, SPINAL, LONG, 22 G X 5 IN, BLACK HUB